# Patient Record
Sex: FEMALE | Race: WHITE | NOT HISPANIC OR LATINO | Employment: OTHER | URBAN - METROPOLITAN AREA
[De-identification: names, ages, dates, MRNs, and addresses within clinical notes are randomized per-mention and may not be internally consistent; named-entity substitution may affect disease eponyms.]

---

## 2017-06-14 ENCOUNTER — RECORDS - HEALTHEAST (OUTPATIENT)
Dept: LAB | Facility: CLINIC | Age: 57
End: 2017-06-14

## 2017-06-14 LAB
CHOLEST SERPL-MCNC: 208 MG/DL
FASTING STATUS PATIENT QL REPORTED: ABNORMAL
HDLC SERPL-MCNC: 62 MG/DL
LDLC SERPL CALC-MCNC: 130 MG/DL
TRIGL SERPL-MCNC: 80 MG/DL

## 2017-06-19 LAB
HPV INTERPRETATION - HISTORICAL: NORMAL
HPV INTERPRETER - HISTORICAL: NORMAL

## 2018-07-27 ENCOUNTER — RECORDS - HEALTHEAST (OUTPATIENT)
Dept: LAB | Facility: CLINIC | Age: 58
End: 2018-07-27

## 2018-07-27 LAB
ALBUMIN SERPL-MCNC: 4 G/DL (ref 3.5–5)
ALP SERPL-CCNC: 81 U/L (ref 45–120)
ALT SERPL W P-5'-P-CCNC: 15 U/L (ref 0–45)
ANION GAP SERPL CALCULATED.3IONS-SCNC: 9 MMOL/L (ref 5–18)
AST SERPL W P-5'-P-CCNC: 15 U/L (ref 0–40)
BILIRUB SERPL-MCNC: 0.5 MG/DL (ref 0–1)
BUN SERPL-MCNC: 14 MG/DL (ref 8–22)
CALCIUM SERPL-MCNC: 9.9 MG/DL (ref 8.5–10.5)
CHLORIDE BLD-SCNC: 107 MMOL/L (ref 98–107)
CHOLEST SERPL-MCNC: 189 MG/DL
CO2 SERPL-SCNC: 24 MMOL/L (ref 22–31)
CREAT SERPL-MCNC: 0.67 MG/DL (ref 0.6–1.1)
FASTING STATUS PATIENT QL REPORTED: NORMAL
GFR SERPL CREATININE-BSD FRML MDRD: >60 ML/MIN/1.73M2
GLUCOSE BLD-MCNC: 100 MG/DL (ref 70–125)
HDLC SERPL-MCNC: 63 MG/DL
LDLC SERPL CALC-MCNC: 111 MG/DL
POTASSIUM BLD-SCNC: 4.1 MMOL/L (ref 3.5–5)
PROT SERPL-MCNC: 6.8 G/DL (ref 6–8)
SODIUM SERPL-SCNC: 140 MMOL/L (ref 136–145)
TRIGL SERPL-MCNC: 73 MG/DL
TSH SERPL DL<=0.005 MIU/L-ACNC: 1.4 UIU/ML (ref 0.3–5)

## 2019-11-11 ENCOUNTER — RECORDS - HEALTHEAST (OUTPATIENT)
Dept: LAB | Facility: CLINIC | Age: 59
End: 2019-11-11

## 2019-11-11 LAB
ALBUMIN SERPL-MCNC: 4 G/DL (ref 3.5–5)
ALP SERPL-CCNC: 95 U/L (ref 45–120)
ALT SERPL W P-5'-P-CCNC: 21 U/L (ref 0–45)
ANION GAP SERPL CALCULATED.3IONS-SCNC: 6 MMOL/L (ref 5–18)
AST SERPL W P-5'-P-CCNC: 19 U/L (ref 0–40)
BILIRUB SERPL-MCNC: 0.5 MG/DL (ref 0–1)
BUN SERPL-MCNC: 13 MG/DL (ref 8–22)
CALCIUM SERPL-MCNC: 9.7 MG/DL (ref 8.5–10.5)
CHLORIDE BLD-SCNC: 104 MMOL/L (ref 98–107)
CHOLEST SERPL-MCNC: 208 MG/DL
CO2 SERPL-SCNC: 29 MMOL/L (ref 22–31)
CREAT SERPL-MCNC: 0.67 MG/DL (ref 0.6–1.1)
FASTING STATUS PATIENT QL REPORTED: ABNORMAL
GFR SERPL CREATININE-BSD FRML MDRD: >60 ML/MIN/1.73M2
GLUCOSE BLD-MCNC: 91 MG/DL (ref 70–125)
HDLC SERPL-MCNC: 71 MG/DL
LDLC SERPL CALC-MCNC: 116 MG/DL
POTASSIUM BLD-SCNC: 3.9 MMOL/L (ref 3.5–5)
PROT SERPL-MCNC: 7 G/DL (ref 6–8)
SODIUM SERPL-SCNC: 139 MMOL/L (ref 136–145)
TRIGL SERPL-MCNC: 105 MG/DL
TSH SERPL DL<=0.005 MIU/L-ACNC: 1.04 UIU/ML (ref 0.3–5)

## 2020-07-29 ENCOUNTER — AMBULATORY - HEALTHEAST (OUTPATIENT)
Dept: CARDIOLOGY | Facility: CLINIC | Age: 60
End: 2020-07-29

## 2020-07-29 ENCOUNTER — RECORDS - HEALTHEAST (OUTPATIENT)
Dept: ADMINISTRATIVE | Facility: OTHER | Age: 60
End: 2020-07-29

## 2020-07-31 ENCOUNTER — COMMUNICATION - HEALTHEAST (OUTPATIENT)
Dept: CARDIOLOGY | Facility: CLINIC | Age: 60
End: 2020-07-31

## 2020-08-03 ENCOUNTER — COMMUNICATION - HEALTHEAST (OUTPATIENT)
Dept: TELEHEALTH | Facility: CLINIC | Age: 60
End: 2020-08-03

## 2020-08-03 ENCOUNTER — OFFICE VISIT - HEALTHEAST (OUTPATIENT)
Dept: CARDIOLOGY | Facility: CLINIC | Age: 60
End: 2020-08-03

## 2020-08-03 ENCOUNTER — AMBULATORY - HEALTHEAST (OUTPATIENT)
Dept: CARDIOLOGY | Facility: CLINIC | Age: 60
End: 2020-08-03

## 2020-08-03 DIAGNOSIS — I10 ESSENTIAL HYPERTENSION, BENIGN: ICD-10-CM

## 2020-08-03 DIAGNOSIS — E03.9 ACQUIRED HYPOTHYROIDISM: ICD-10-CM

## 2020-08-03 DIAGNOSIS — I48.0 PAROXYSMAL ATRIAL FIBRILLATION (H): ICD-10-CM

## 2020-08-03 DIAGNOSIS — E66.812 CLASS 2 OBESITY DUE TO EXCESS CALORIES IN ADULT: ICD-10-CM

## 2020-08-03 DIAGNOSIS — E66.09 CLASS 2 OBESITY DUE TO EXCESS CALORIES IN ADULT: ICD-10-CM

## 2020-08-03 LAB
ANION GAP SERPL CALCULATED.3IONS-SCNC: 8 MMOL/L (ref 5–18)
ATRIAL RATE - MUSE: 60 BPM
BUN SERPL-MCNC: 13 MG/DL (ref 8–22)
CALCIUM SERPL-MCNC: 9.7 MG/DL (ref 8.5–10.5)
CHLORIDE BLD-SCNC: 103 MMOL/L (ref 98–107)
CO2 SERPL-SCNC: 28 MMOL/L (ref 22–31)
CREAT SERPL-MCNC: 0.78 MG/DL (ref 0.6–1.1)
DIASTOLIC BLOOD PRESSURE - MUSE: NORMAL
GFR SERPL CREATININE-BSD FRML MDRD: >60 ML/MIN/1.73M2
GLUCOSE BLD-MCNC: 91 MG/DL (ref 70–125)
INTERPRETATION ECG - MUSE: NORMAL
P AXIS - MUSE: 15 DEGREES
POTASSIUM BLD-SCNC: 4.2 MMOL/L (ref 3.5–5)
PR INTERVAL - MUSE: 176 MS
QRS DURATION - MUSE: 88 MS
QT - MUSE: 434 MS
QTC - MUSE: 434 MS
R AXIS - MUSE: -14 DEGREES
SODIUM SERPL-SCNC: 139 MMOL/L (ref 136–145)
SYSTOLIC BLOOD PRESSURE - MUSE: NORMAL
T AXIS - MUSE: 45 DEGREES
TSH SERPL DL<=0.005 MIU/L-ACNC: 3.36 UIU/ML (ref 0.3–5)
VENTRICULAR RATE- MUSE: 60 BPM

## 2020-08-03 RX ORDER — LEVOTHYROXINE SODIUM 175 UG/1
175 TABLET ORAL DAILY
Status: SHIPPED | COMMUNITY
Start: 2020-08-03 | End: 2021-12-06 | Stop reason: DRUGHIGH

## 2020-08-03 RX ORDER — HYDROCHLOROTHIAZIDE 12.5 MG/1
12.5 TABLET ORAL DAILY
Status: SHIPPED | COMMUNITY
Start: 2020-08-03 | End: 2023-06-16 | Stop reason: ALTCHOICE

## 2020-08-03 RX ORDER — AMLODIPINE AND BENAZEPRIL HYDROCHLORIDE 10; 20 MG/1; MG/1
1 CAPSULE ORAL DAILY
Status: SHIPPED | COMMUNITY
Start: 2020-08-03 | End: 2021-06-16

## 2020-08-03 RX ORDER — PREDNISOLONE ACETATE 10 MG/ML
1 SUSPENSION/ DROPS OPHTHALMIC DAILY
Status: SHIPPED | COMMUNITY
Start: 2020-08-03 | End: 2022-11-16

## 2020-08-03 ASSESSMENT — MIFFLIN-ST. JEOR: SCORE: 1821.88

## 2020-08-05 ENCOUNTER — COMMUNICATION - HEALTHEAST (OUTPATIENT)
Dept: CARDIOLOGY | Facility: CLINIC | Age: 60
End: 2020-08-05

## 2020-08-25 ENCOUNTER — HOSPITAL ENCOUNTER (OUTPATIENT)
Dept: NUCLEAR MEDICINE | Facility: HOSPITAL | Age: 60
Discharge: HOME | End: 2020-08-25
Attending: INTERNAL MEDICINE

## 2020-08-25 ENCOUNTER — HOSPITAL ENCOUNTER (OUTPATIENT)
Dept: CARDIOLOGY | Facility: HOSPITAL | Age: 60
Discharge: HOME | End: 2020-08-25
Attending: INTERNAL MEDICINE

## 2020-08-25 ENCOUNTER — AMBULATORY - HEALTHEAST (OUTPATIENT)
Dept: CARDIOLOGY | Facility: CLINIC | Age: 60
End: 2020-08-25

## 2020-08-25 DIAGNOSIS — I48.0 PAROXYSMAL ATRIAL FIBRILLATION (H): ICD-10-CM

## 2020-08-25 DIAGNOSIS — I77.810 ASCENDING AORTA DILATATION (H): ICD-10-CM

## 2020-08-25 DIAGNOSIS — I10 ESSENTIAL HYPERTENSION, BENIGN: ICD-10-CM

## 2020-08-25 LAB
ASCENDING AORTA: 5 CM
CV STRESS CURRENT BP HE: NORMAL
CV STRESS CURRENT HR HE: 58
CV STRESS CURRENT HR HE: 61
CV STRESS CURRENT HR HE: 63
CV STRESS CURRENT HR HE: 64
CV STRESS CURRENT HR HE: 64
CV STRESS CURRENT HR HE: 65
CV STRESS CURRENT HR HE: 66
CV STRESS CURRENT HR HE: 67
CV STRESS CURRENT HR HE: 69
CV STRESS CURRENT HR HE: 70
CV STRESS CURRENT HR HE: 71
CV STRESS CURRENT HR HE: 76
CV STRESS CURRENT HR HE: 77
CV STRESS CURRENT HR HE: 77
CV STRESS CURRENT HR HE: 83
CV STRESS CURRENT HR HE: 84
CV STRESS CURRENT HR HE: 86
CV STRESS CURRENT HR HE: 88
CV STRESS DEVIATION TIME HE: NORMAL
CV STRESS ECHO PERCENT HR HE: NORMAL
CV STRESS EXERCISE STAGE HE: NORMAL
CV STRESS FINAL RESTING BP HE: NORMAL
CV STRESS FINAL RESTING HR HE: 67
CV STRESS MAX HR HE: 89
CV STRESS MAX TREADMILL GRADE HE: 0
CV STRESS MAX TREADMILL SPEED HE: 0
CV STRESS PEAK DIA BP HE: NORMAL
CV STRESS PEAK SYS BP HE: NORMAL
CV STRESS PHASE HE: NORMAL
CV STRESS PROTOCOL HE: NORMAL
CV STRESS RESTING PT POSITION HE: NORMAL
CV STRESS ST DEVIATION AMOUNT HE: NORMAL
CV STRESS ST DEVIATION ELEVATION HE: NORMAL
CV STRESS ST EVELATION AMOUNT HE: NORMAL
CV STRESS TEST TYPE HE: NORMAL
CV STRESS TOTAL STAGE TIME MIN 1 HE: NORMAL
DOP CALC LVOT AREA: 3.14 CM2
DOP CALC LVOT DIAMETER: 2 CM
DOP CALC LVOT PEAK VEL: 83 CM/S
DOP CALC LVOT STROKE VOLUME: 62.8 CM3
DOP CALC MV VTI: 29.6 CM
DOP CALCLVOT PEAK VEL VTI: 20 CM
EJECTION FRACTION: 59 % (ref 55–75)
FRACTIONAL SHORTENING: 27.7 % (ref 28–44)
INTERVENTRICULAR SEPTUM IN END DIASTOLE: 0.73 CM (ref 0.6–0.9)
IVS/PW RATIO: 1
LA AREA 1: 19 CM2
LA AREA 2: 21 CM2
LEFT ATRIUM LENGTH: 5.4 CM
LEFT ATRIUM VOLUME: 62.8 ML
LEFT VENTRICLE DIASTOLIC VOLUME: 103 CM3 (ref 46–106)
LEFT VENTRICLE SYSTOLIC VOLUME: 42.5 CM3 (ref 14–42)
LEFT VENTRICULAR INTERNAL DIMENSION IN DIASTOLE: 5.53 CM (ref 3.8–5.2)
LEFT VENTRICULAR INTERNAL DIMENSION IN SYSTOLE: 4 CM (ref 2.2–3.5)
LEFT VENTRICULAR MASS: 147.6 G
LEFT VENTRICULAR OUTFLOW TRACT MEAN GRADIENT: 1 MMHG
LEFT VENTRICULAR OUTFLOW TRACT MEAN VELOCITY: 53.8 CM/S
LEFT VENTRICULAR OUTFLOW TRACT PEAK GRADIENT: 3 MMHG
LEFT VENTRICULAR POSTERIOR WALL IN END DIASTOLE: 0.76 CM (ref 0.6–0.9)
MITRAL VALVE E/A RATIO: 0.9
MITRAL VALVE MEAN INFLOW VELOCITY: 55.8 CM/S
MITRAL VALVE PEAK VELOCITY: 103 CM/S
MV AREA VTI: 2.12 CM2
MV AVERAGE E/E' RATIO: 7.1 CM/S
MV DECELERATION TIME: 232 MS
MV E'TISSUE VEL-LAT: 10.3 CM/S
MV E'TISSUE VEL-MED: 8.8 CM/S
MV LATERAL E/E' RATIO: 6.6
MV MEAN GRADIENT: 1 MMHG
MV MEDIAL E/E' RATIO: 7.7
MV PEAK A VELOCITY: 75.2 CM/S
MV PEAK E VELOCITY: 67.9 CM/S
MV PEAK GRADIENT: 4.2 MMHG
MV VALVE AREA BY CONTINUITY EQUATION: 2.1 CM2
NUC STRESS EJECTION FRACTION: 59 %
PR MAX PG: 7 MMHG
PR PEAK VELOCITY: 130 CM/S
PR PEAK VELOCITY: 130 CM/S
RATE PRESSURE PRODUCT: NORMAL
STRESS ECHO BASELINE HR: 59
STRESS ECHO CALCULATED PERCENT HR: 56 %
STRESS ECHO LAST STRESS DIASTOLIC BP: 61
STRESS ECHO LAST STRESS HR: 77
STRESS ECHO LAST STRESS SYSTOLIC BP: 126
STRESS ECHO TARGET HR: 159
TRICUSPID REGURGITATION PEAK PRESSURE GRADIENT: 20.8 MMHG
TRICUSPID VALVE ANULAR PLANE SYSTOLIC EXCURSION: 2.3 CM
TRICUSPID VALVE PEAK REGURGITANT VELOCITY: 228 CM/S

## 2020-08-31 ENCOUNTER — COMMUNICATION - HEALTHEAST (OUTPATIENT)
Dept: CARDIOLOGY | Facility: CLINIC | Age: 60
End: 2020-08-31

## 2020-09-08 ENCOUNTER — HOSPITAL ENCOUNTER (OUTPATIENT)
Dept: CT IMAGING | Facility: HOSPITAL | Age: 60
Discharge: HOME | End: 2020-09-08
Attending: INTERNAL MEDICINE

## 2020-09-08 DIAGNOSIS — I77.810 ASCENDING AORTA DILATATION (H): ICD-10-CM

## 2020-09-08 LAB
CREAT BLD-MCNC: 0.9 MG/DL (ref 0.6–1.1)
GFR SERPL CREATININE-BSD FRML MDRD: >60 ML/MIN/1.73M2

## 2020-09-24 ENCOUNTER — COMMUNICATION - HEALTHEAST (OUTPATIENT)
Dept: CARDIOLOGY | Facility: CLINIC | Age: 60
End: 2020-09-24

## 2020-10-01 ENCOUNTER — AMBULATORY - HEALTHEAST (OUTPATIENT)
Dept: CARDIOLOGY | Facility: CLINIC | Age: 60
End: 2020-10-01

## 2020-10-01 DIAGNOSIS — I48.0 PAROXYSMAL ATRIAL FIBRILLATION (H): ICD-10-CM

## 2020-10-02 ENCOUNTER — COMMUNICATION - HEALTHEAST (OUTPATIENT)
Dept: CARDIOLOGY | Facility: CLINIC | Age: 60
End: 2020-10-02

## 2020-10-19 ENCOUNTER — AMBULATORY - HEALTHEAST (OUTPATIENT)
Dept: CARDIOLOGY | Facility: CLINIC | Age: 60
End: 2020-10-19

## 2020-10-19 ENCOUNTER — OFFICE VISIT - HEALTHEAST (OUTPATIENT)
Dept: CARDIOLOGY | Facility: CLINIC | Age: 60
End: 2020-10-19

## 2020-10-19 DIAGNOSIS — I71.20 THORACIC AORTIC ANEURYSM WITHOUT RUPTURE (H): ICD-10-CM

## 2020-10-19 DIAGNOSIS — E66.812 CLASS 2 OBESITY DUE TO EXCESS CALORIES IN ADULT: ICD-10-CM

## 2020-10-19 DIAGNOSIS — E66.09 CLASS 2 OBESITY DUE TO EXCESS CALORIES IN ADULT: ICD-10-CM

## 2020-10-19 DIAGNOSIS — I77.810 ASCENDING AORTA DILATATION (H): ICD-10-CM

## 2020-10-19 DIAGNOSIS — G47.33 OSA (OBSTRUCTIVE SLEEP APNEA): ICD-10-CM

## 2020-10-19 DIAGNOSIS — I10 ESSENTIAL HYPERTENSION, BENIGN: ICD-10-CM

## 2020-10-19 DIAGNOSIS — I48.0 PAROXYSMAL ATRIAL FIBRILLATION (H): ICD-10-CM

## 2020-10-19 ASSESSMENT — MIFFLIN-ST. JEOR: SCORE: 1713.01

## 2020-11-04 ENCOUNTER — COMMUNICATION - HEALTHEAST (OUTPATIENT)
Dept: CARDIOLOGY | Facility: CLINIC | Age: 60
End: 2020-11-04

## 2020-11-04 DIAGNOSIS — I48.0 PAROXYSMAL ATRIAL FIBRILLATION (H): ICD-10-CM

## 2020-11-13 ENCOUNTER — COMMUNICATION - HEALTHEAST (OUTPATIENT)
Dept: CARDIOLOGY | Facility: CLINIC | Age: 60
End: 2020-11-13

## 2020-11-13 DIAGNOSIS — I48.0 PAROXYSMAL ATRIAL FIBRILLATION (H): ICD-10-CM

## 2020-11-20 ENCOUNTER — RECORDS - HEALTHEAST (OUTPATIENT)
Dept: LAB | Facility: CLINIC | Age: 60
End: 2020-11-20

## 2020-11-20 LAB
ALBUMIN SERPL-MCNC: 4.3 G/DL (ref 3.5–5)
ALP SERPL-CCNC: 87 U/L (ref 45–120)
ALT SERPL W P-5'-P-CCNC: 17 U/L (ref 0–45)
ANION GAP SERPL CALCULATED.3IONS-SCNC: 7 MMOL/L (ref 5–18)
AST SERPL W P-5'-P-CCNC: 15 U/L (ref 0–40)
BILIRUB SERPL-MCNC: 0.5 MG/DL (ref 0–1)
BUN SERPL-MCNC: 11 MG/DL (ref 8–22)
CALCIUM SERPL-MCNC: 9.8 MG/DL (ref 8.5–10.5)
CHLORIDE BLD-SCNC: 104 MMOL/L (ref 98–107)
CHOLEST SERPL-MCNC: 224 MG/DL
CO2 SERPL-SCNC: 30 MMOL/L (ref 22–31)
CREAT SERPL-MCNC: 0.71 MG/DL (ref 0.6–1.1)
FASTING STATUS PATIENT QL REPORTED: ABNORMAL
GFR SERPL CREATININE-BSD FRML MDRD: >60 ML/MIN/1.73M2
GLUCOSE BLD-MCNC: 98 MG/DL (ref 70–125)
HDLC SERPL-MCNC: 72 MG/DL
LDLC SERPL CALC-MCNC: 130 MG/DL
POTASSIUM BLD-SCNC: 3.7 MMOL/L (ref 3.5–5)
PROT SERPL-MCNC: 7.3 G/DL (ref 6–8)
SODIUM SERPL-SCNC: 141 MMOL/L (ref 136–145)
TRIGL SERPL-MCNC: 110 MG/DL
TSH SERPL DL<=0.005 MIU/L-ACNC: 3.73 UIU/ML (ref 0.3–5)

## 2021-01-04 ENCOUNTER — COMMUNICATION - HEALTHEAST (OUTPATIENT)
Dept: CARDIOLOGY | Facility: CLINIC | Age: 61
End: 2021-01-04

## 2021-01-04 DIAGNOSIS — I48.0 PAROXYSMAL ATRIAL FIBRILLATION (H): ICD-10-CM

## 2021-04-14 ENCOUNTER — COMMUNICATION - HEALTHEAST (OUTPATIENT)
Dept: CARDIOLOGY | Facility: CLINIC | Age: 61
End: 2021-04-14

## 2021-04-14 DIAGNOSIS — I48.0 PAROXYSMAL ATRIAL FIBRILLATION (H): ICD-10-CM

## 2021-04-14 RX ORDER — FLECAINIDE ACETATE 50 MG/1
25 TABLET ORAL 2 TIMES DAILY
Qty: 90 TABLET | Refills: 0 | Status: SHIPPED | OUTPATIENT
Start: 2021-04-14 | End: 2021-07-06

## 2021-04-16 ENCOUNTER — HOSPITAL ENCOUNTER (OUTPATIENT)
Dept: CT IMAGING | Facility: HOSPITAL | Age: 61
Discharge: HOME | End: 2021-04-16
Attending: INTERNAL MEDICINE

## 2021-04-16 DIAGNOSIS — I77.810 ASCENDING AORTA DILATATION (H): ICD-10-CM

## 2021-04-16 DIAGNOSIS — I71.20 THORACIC AORTIC ANEURYSM WITHOUT RUPTURE (H): ICD-10-CM

## 2021-04-16 LAB
CREAT BLD-MCNC: 0.8 MG/DL (ref 0.6–1.1)
GFR SERPL CREATININE-BSD FRML MDRD: >60 ML/MIN/1.73M2

## 2021-04-19 ENCOUNTER — RECORDS - HEALTHEAST (OUTPATIENT)
Dept: ADMINISTRATIVE | Facility: OTHER | Age: 61
End: 2021-04-19

## 2021-04-19 ENCOUNTER — AMBULATORY - HEALTHEAST (OUTPATIENT)
Dept: CARDIOLOGY | Facility: CLINIC | Age: 61
End: 2021-04-19

## 2021-04-19 ENCOUNTER — COMMUNICATION - HEALTHEAST (OUTPATIENT)
Dept: CARDIOLOGY | Facility: CLINIC | Age: 61
End: 2021-04-19

## 2021-04-21 ENCOUNTER — OFFICE VISIT - HEALTHEAST (OUTPATIENT)
Dept: CARDIOLOGY | Facility: CLINIC | Age: 61
End: 2021-04-21

## 2021-04-21 DIAGNOSIS — E66.812 CLASS 2 OBESITY DUE TO EXCESS CALORIES IN ADULT: ICD-10-CM

## 2021-04-21 DIAGNOSIS — I48.0 PAROXYSMAL ATRIAL FIBRILLATION (H): ICD-10-CM

## 2021-04-21 DIAGNOSIS — G47.33 OSA (OBSTRUCTIVE SLEEP APNEA): ICD-10-CM

## 2021-04-21 DIAGNOSIS — I10 ESSENTIAL HYPERTENSION, BENIGN: ICD-10-CM

## 2021-04-21 DIAGNOSIS — E03.9 ACQUIRED HYPOTHYROIDISM: ICD-10-CM

## 2021-04-21 DIAGNOSIS — E66.09 CLASS 2 OBESITY DUE TO EXCESS CALORIES IN ADULT: ICD-10-CM

## 2021-04-21 DIAGNOSIS — I71.20 THORACIC AORTIC ANEURYSM WITHOUT RUPTURE (H): ICD-10-CM

## 2021-04-21 RX ORDER — METOPROLOL TARTRATE 25 MG/1
12.5 TABLET, FILM COATED ORAL 2 TIMES DAILY
Qty: 30 TABLET | Refills: 1 | Status: SHIPPED | OUTPATIENT
Start: 2021-04-21 | End: 2021-11-22 | Stop reason: DRUGHIGH

## 2021-04-21 ASSESSMENT — MIFFLIN-ST. JEOR: SCORE: 1826.41

## 2021-06-04 VITALS
RESPIRATION RATE: 14 BRPM | SYSTOLIC BLOOD PRESSURE: 120 MMHG | HEART RATE: 64 BPM | BODY MASS INDEX: 42.53 KG/M2 | HEIGHT: 67 IN | WEIGHT: 271 LBS | DIASTOLIC BLOOD PRESSURE: 76 MMHG

## 2021-06-04 VITALS
BODY MASS INDEX: 38.77 KG/M2 | RESPIRATION RATE: 14 BRPM | HEART RATE: 68 BPM | HEIGHT: 67 IN | DIASTOLIC BLOOD PRESSURE: 84 MMHG | WEIGHT: 247 LBS | SYSTOLIC BLOOD PRESSURE: 140 MMHG

## 2021-06-05 VITALS
SYSTOLIC BLOOD PRESSURE: 128 MMHG | HEART RATE: 64 BPM | WEIGHT: 272 LBS | RESPIRATION RATE: 16 BRPM | DIASTOLIC BLOOD PRESSURE: 80 MMHG | HEIGHT: 67 IN | BODY MASS INDEX: 42.69 KG/M2

## 2021-06-10 NOTE — PROGRESS NOTES
aorta is slightly enlarged, given this we need to obtain CTA looking at entire thoracic aorta. - LBF

## 2021-06-10 NOTE — PATIENT INSTRUCTIONS - HE
Ms. Hedy Haq,  It certainly was nice to meet you today.  Per our conversation you're having some skipped beats in the chest.  This represents atrial fibrillation and the reason I am checking the ultrasound of the heart and the heart monitor as well as a stress test.  We will call you the results of these tests.    I will plan on seeing you in 1-2 months or sooner if need be.  Wero Macias

## 2021-06-10 NOTE — TELEPHONE ENCOUNTER
Wellness Screening Tool  Symptom Screening:  Do you have one of the following NEW symptoms:    Fever (subjective or >100.0)?  No    A new cough?  No    Shortness of breath?  No     Chills? No     New loss of taste or smell? No     Generalized body aches? No     New persistent headache? No     New sore throat? No     Nausea, vomiting, or diarrhea?  No    Within the past 3 weeks, have you been exposed to someone with a known positive illness below:    COVID-19 (known or suspected)?  No    Chicken pox?  No    Mealses?  No    Pertussis?  No    Patient notified of visitor policy- They may have one person accompany them to their appointment, but they will need to wear a mask and will be screened upon arrival for symptoms: Yes  Pt informed to wear a mask: Yes  Pt notified if they develop any symptoms listed above, prior to their appointment, they are to call the clinic directly at [phone] for further instructions.  Yes  Patient's appointment status: Patient will be seen in clinic as scheduled on 8/3/2020 9910 with FABIO

## 2021-06-11 NOTE — TELEPHONE ENCOUNTER
----- Message from SYED Bernal sent at 8/31/2020  7:10 AM CDT -----  Regarding: SARA Notification  We received a notification for new onset afib. The report is in the g drive. Thanks.    Dr. Macias, please review SARA notification above regarding new onset of A fib  on 8/28. Any new recs?

## 2021-06-11 NOTE — TELEPHONE ENCOUNTER
----- Message from Alisia Macias MD sent at 9/24/2020 12:55 PM CDT -----  Can we pass this on to patient, mild enlargement of the aorta, I would like to recheck this in about a year, she can see me to discuss whether there is any family history of dissections or if she tells you some as well.  If she would like I can try to call her later. LF  ----- Message -----  From: Richard Gore MD  Sent: 9/24/2020  12:15 PM CDT  To: Alisia Macias MD, Yenni Aj RN    Les,    Sorry for the delay on this CTA for Hedy Haq. I would normally follow this with another CTA in 6-12 months and then every 12 months after that. If she has any evidence of a genetic abnormality associated with aortopathy, then I would operate sooner (I.e. now for Todd Danlos, Loeys Mahesh or Marfan Syndrome). I would also get a very clear family history about sudden death or death outside the hospital without a known cause, as well as any history of aneurysm or dissection.     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 This is a form of a vascular ring but unlikely to be of clinical significance given her age. 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     The echo looks like a tricuspid valve so no concern for bicuspid. Those are my thoughts. Does that sound reasonable? I am happy to see her and discuss all of this if you would like. I know these patients are often anxious once they understand the natural history and complications of thoracic aortic aneurysms.    Laura, thank you for bringing this to my attention!    Pa,  Raymundo  ----- Message -----  From: Yenni Aj RN  Sent: 9/24/2020   8:55 AM CDT  To: Richard Gore MD    Did you get this? Colleen is looking for you to weigh in on her CT for poss  ascending.    Laura Yarbrough  ----- Message -----  From: Darling Shields, RN  Sent: 9/23/2020   3:26 PM CDT  To: KRISTYN Vital,  Dr. Macias had sent this result note to Dr. Gore to seek his input on surgery. Is there a way you can forward this on to him?  Thanks for any help!  Darling RN     ===View-only below this line===   ----- Message -----   From: Alisia Macias MD   Sent: 9/9/2020   8:37 AM CDT   To: Darling Shields RN, #       Raymundo,   59-year-old lady that I saw incidentally for atrial fibrillation.  Echo shows aortic enlargement so I obtain CTA which shows fusiform 4.9 cm aneurysm.   This is borderline, is a somebody would like to see and potentially need surgery?  If so allow me to contact her and initiate the plan.   LF

## 2021-06-11 NOTE — TELEPHONE ENCOUNTER
Called pt to discuss her heart monitor notifications. She states she is feeling better today. She understands that LBF will continue to monitor and potentially refer to EP for further therapy. 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 She is wondering if Dr. Macias would be ok with this. She reports need to hold her eliquis but she cannot remember how long this hold is needed for.    Dr. Macias, pt is scheduled for R eye cataract surgery with cornea transplantation on 9/22 and wants to make sure you are ok with her proceeding with this procedure? She will be needing to hold her eliquis medication (she wasn't sure exact length of time but will get back to us on this).

## 2021-06-11 NOTE — TELEPHONE ENCOUNTER
Called patient back and updated her on CV surgeon response and LBF response regarding her CTA chest. Pt verbalized understanding and reports that she does not have a family hx of dissections or aneurysms to her knowledge. She did discuss with her mother and she is not aware of a family hx either. Pt has a follow up visit with Dr. Macias and will plan to discuss with him in person at this appointment on October 19th. She believes that these discussions are better served face to face at any rate. 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 -[facility] [place]      Dr. Macias,  Update only- I spoke with Hedy this afternoon. She will plan to discuss CTA chest results and annual scans at upcoming follow up appt on October 19th. She does not have a family hx that she is aware of.  Thanks!  Mal

## 2021-06-12 NOTE — PROGRESS NOTES
----- Message -----  From: Alisia Macias MD  Sent: 10/19/2020   1:13 PM CDT  To: Darling Shields RN    I was planning on rechecking CTA in a year, can you please arrange for repeat CTA in 6 months to further evaluate aorta.  LF  ----- Message -----  From: Richard Gore MD  Sent: 10/19/2020   1:00 PM CDT  To: Alisia Macias MD, Darling Shields, RN, *    Wero    It is borderline. If she has a family history of aortic dissection or rupture or sudden death, then I would consider repair now. Otherwise it could be followed. Her aortic valve looks tricuspid on echo but has mild AI. As the aneurysm enlarges this may worsen, but we still may be able to salvage the valve. The aneurysm actually measures a little larger (5.1 cm) on echo. I still think it could be followed until is reaches 5.5 cm. Maybe a repeat echo and/or CTA in 6 months and then at a year if it remains stable. She should not lift more than 40 pounds and I agree with treating her hypertension with a goal SBP<140 mmHg for sure and more toward 120 mmHg. She might want to get a BP cuff and check it at home to make sure it is not spuriously high.     I can see her if you want--just let me know.    Thank you  Raymundo  ----- Message -----  From: Alisia Macias MD  Sent: 9/9/2020   8:37 AM CDT  To: Darling Shields RN, *    Raymundo,  59-year-old lady that I saw incidentally for atrial fibrillation.  Echo shows aortic enlargement so I obtain CTA which shows fusiform 4.9 cm aneurysm.  This is borderline, is a somebody would like to see and potentially need surgery?  If so allow me to contact her and initiate the plan.  LF

## 2021-06-12 NOTE — PATIENT INSTRUCTIONS - HE
Ms Hedy PARISH Haq,  I enjoyed visiting with you again today.  I am glad to hear you are doing well.  Per our conversation try the FLECANIDE two times a day and let me know if works.  I will plan on seeing you 6 months or call if issues.  Wero Macias

## 2021-06-13 NOTE — TELEPHONE ENCOUNTER
----- Message from BLESSING Schreiber sent at 11/13/2020  3:22 PM CST -----  Regarding: LBF PATIENT  General phone call:    Caller: PATIENT  Primary cardiologist: FABIO  Detailed reason for call: NEEDS REFILL ON flecainide (TAMBOCOR) 50 MG tablet, [id number] [place] [address] [address] , PATIENT STATES SHE TAKES HALF 50MG TABLET two times a day, THEREFORE THE PHARMACY SAYS IT IS TO SOON TO FILL THE RX.    Best phone number: [phone]    Best time to contact: ANY    Ok to leave a detailed message? YES    Device? NO    Additional Info:         Per note with FABIO on 10/19/2020:   Plan  1.  Recheck chest CT and of aorta in a year.  2.  Weight loss.  3.  Increase 525 mg p.o. twice daily, if recurrent A. fib increase to 50 mg p.o. twice daily and then consider ablation.  4.  Sleep study inappropriate sleep apnea therapy.  5.  Follow-up me in 6 months to address all this.  6.  Monitor blood pressure and if needed increase meds further          Called patient and clarified that she is on two times a day dosing now at 25 mg twice a day. This is accurate so writer sent over to [facility] with a note to pharmacy that it is a dose frequency change and to fill asap. -[facility] [place]

## 2021-06-14 ENCOUNTER — COMMUNICATION - HEALTHEAST (OUTPATIENT)
Dept: CARDIOLOGY | Facility: CLINIC | Age: 61
End: 2021-06-14

## 2021-06-14 DIAGNOSIS — I48.0 PAROXYSMAL ATRIAL FIBRILLATION (H): ICD-10-CM

## 2021-06-14 DIAGNOSIS — I71.20 THORACIC AORTIC ANEURYSM WITHOUT RUPTURE (H): ICD-10-CM

## 2021-06-14 NOTE — TELEPHONE ENCOUNTER
----- Message from BLESSING Singh Junior sent at 1/4/2021  9:31 AM CST -----  Regarding: FABIO Patient  General phone call:    Caller: Patient  Primary cardiologist: FABIO  Detailed reason for call: Patient questions regarding the medication  Best phone number: [phone]  Best time to contact: Any  Ok to leave a detailed message? No  Device? No    Additional Info:     Pt requesting refill. She was only given 45 tablets of her flecainide pill last refill. She is taking 25 mg two times a day. 90-day refill send. -kcl

## 2021-06-16 ENCOUNTER — COMMUNICATION - HEALTHEAST (OUTPATIENT)
Dept: CARDIOLOGY | Facility: CLINIC | Age: 61
End: 2021-06-16

## 2021-06-16 DIAGNOSIS — I48.0 PAROXYSMAL ATRIAL FIBRILLATION (H): ICD-10-CM

## 2021-06-16 DIAGNOSIS — I71.20 THORACIC AORTIC ANEURYSM WITHOUT RUPTURE (H): ICD-10-CM

## 2021-06-16 PROBLEM — I10 ESSENTIAL HYPERTENSION, BENIGN: Status: ACTIVE | Noted: 2020-08-03

## 2021-06-16 PROBLEM — G47.33 OSA (OBSTRUCTIVE SLEEP APNEA): Status: ACTIVE | Noted: 2020-10-19

## 2021-06-16 PROBLEM — E03.9 ACQUIRED HYPOTHYROIDISM: Status: ACTIVE | Noted: 2020-08-03

## 2021-06-16 PROBLEM — E66.09 CLASS 2 OBESITY DUE TO EXCESS CALORIES IN ADULT: Status: ACTIVE | Noted: 2020-08-03

## 2021-06-16 PROBLEM — E66.812 CLASS 2 OBESITY DUE TO EXCESS CALORIES IN ADULT: Status: ACTIVE | Noted: 2020-08-03

## 2021-06-16 RX ORDER — METOPROLOL TARTRATE 25 MG/1
25 TABLET, FILM COATED ORAL 2 TIMES DAILY
Qty: 180 TABLET | Refills: 2 | Status: SHIPPED | OUTPATIENT
Start: 2021-06-16 | End: 2022-03-15

## 2021-06-16 RX ORDER — BENAZEPRIL HYDROCHLORIDE 20 MG/1
20 TABLET ORAL DAILY
Qty: 90 TABLET | Refills: 1 | Status: SHIPPED | OUTPATIENT
Start: 2021-06-16 | End: 2021-12-13

## 2021-06-16 NOTE — PATIENT INSTRUCTIONS - HE
Ms Hedy PARISH Haq,  I enjoyed visiting with you again today.  I am glad to hear you are doing well.  Per our conversation let us try the METOPROLOL 1/2 two times a day and let me know if issues at [phone].  I will plan on seeing you 6 months or sooner if needed.  Wero Macias

## 2021-06-17 NOTE — TELEPHONE ENCOUNTER
Telephone Encounter by Iris Freitas RN at 8/31/2020  2:36 PM     Author: Iris Freitas RN Service: -- Author Type: Registered Nurse    Filed: 8/31/2020  2:38 PM Encounter Date: 8/31/2020 Status: Signed    : Iris Freitas RN (Registered Nurse)       Alisia Macias MD Larsen, Kellie C, RN    Caller: Unspecified (Today,  7:10 AM)               Given that she is having fairly frequent atrial fibrillation we will continue to watch the monitor, if several more episodes will then send her to EP for discussion of antiarrhythmic therapy but most likely ablation.  LF      Called and LM for pt to return call to discuss. -kcl

## 2021-06-17 NOTE — TELEPHONE ENCOUNTER
Telephone Encounter by Iris Freitas RN at 9/1/2020 11:34 AM     Author: Iris Freitas RN Service: -- Author Type: Registered Nurse    Filed: 9/1/2020 11:38 AM Encounter Date: 8/31/2020 Status: Addendum    : Iris Freitas RN (Registered Nurse)    Related Notes: Original Note by Iris Freitas RN (Registered Nurse) filed at 9/1/2020 11:36 AM       Alisia Macias MD Larsen, Kellie C, RN    Caller: Unspecified (Yesterday,  7:10 AM)               No issues proceeding with eye surgery given atrial fibrillation.  Agree with evaluation for Eliquis hold with eye doctors recommendations.  LF      Called pt and shared with her [facility] recs. No further questions. -kcl

## 2021-06-17 NOTE — TELEPHONE ENCOUNTER
Telephone Encounter by Darling Shields RN at 10/2/2020 10:22 AM     Author: Darling Shields RN Service: -- Author Type: Registered Nurse    Filed: 10/2/2020 10:22 AM Encounter Date: 10/2/2020 Status: Signed    : Darling Shields RN (Registered Nurse)       Message  Received: Yesterday  Message Contents   Alisia Macias MD Caswell, Mallory J RN             Thanks very helpful as long as a note to this effect is in chart.   LF    Previous Messages    ----- Message -----   From: Darling Shields RN   Sent: 10/1/2020   2:25 PM CDT   To: Alisia Macias MD     Hi Dr. Macias,   I spoke with Hedy- she could feel the Afib episodes - describes it as feeling an anxious feeling in her chest like a bubbling. She is agreeable to Flecainide- so will send forth a small supply at below dose-25 mg once a day and check in with her next week. She will see you 10/19. Just an update.   Thanks,   Mal   ----- Message -----   From: Alisia Macias MD   Sent: 9/29/2020   7:10 PM CDT   To: Darling Shields RN     Please let her know about 14 % A fib and up to 10 hours in a row. If she if feeling it would go to daily flecanide 1 pill or 25 mg a day. If not really that bad then continue as doing.

## 2021-06-20 NOTE — LETTER
Letter by Darling Shields RN at      Author: Darling Shields RN Service: -- Author Type: --    Filed:  Encounter Date: 8/5/2020 Status: (Other)         Hedy Haq  [address] [facility] [address]             August 5, 2020         Dear Ms. Haq,    Below are the results from your recent visit:    Resulted Orders   Basic Metabolic Panel   Result Value Ref Range    Sodium 139 136 - 145 mmol/L    Potassium 4.2 3.5 - 5.0 mmol/L    Chloride 103 98 - 107 mmol/L    CO2 28 22 - 31 mmol/L    Anion Gap, Calculation 8 5 - 18 mmol/L    Glucose 91 70 - 125 mg/dL    Calcium 9.7 8.5 - 10.5 mg/dL    BUN 13 8 - 22 mg/dL    Creatinine 0.78 0.60 - 1.10 mg/dL    GFR MDRD Af Amer >60 >60 mL/min/1.73m2    GFR MDRD Non Af Amer >60 >60 mL/min/1.73m2    Narrative    Fasting Glucose reference range is 70-99 mg/dL per  American Diabetes Association (ADA) guidelines.   TSH   Result Value Ref Range    TSH 3.36 0.30 - 5.00 uIU/mL     The test results show that your blood work is stable. Here is the message from Dr. 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  TSH normal potassium normal.  This is not the reason why she has atrial fibrillation.   LF            Please call with questions or contact us using [employer].    Sincerely,    Darling SIMONS

## 2021-06-21 NOTE — LETTER
Letter by Alisia Macias MD at      Author: Alisia Macias MD Service: -- Author Type: --    Filed:  Encounter Date: 4/19/2021 Status: (Other)         Hedy Haq  [address] [facility] [address]     April 19, 2021     Dear Ms. Haq,    Below are the results from your recent visit:    Resulted Orders   CTA Chest    Narrative    EXAM: CTA CHEST  LOCATION: [facility]  DATE/TIME: 4/16/2021 6:35 PM    FINDINGS:  ANGIOGRAM CHEST: The main pulmonary artery is 38 mm in diameter. No pulmonary emboli. The sinuses of Valsalva are approximately 3.6 cm in diameter. The sinotubular junction is normal in size, though measurement is difficult due to motion. There is   fusiform aneurysmal enlargement of the ascending tubular aorta, which is 4.7 x 4.6 cm in maximum dimension with biorthogonal measurements (unchanged when using similar measurement technique).  Incidentally noted is an aberrant right subclavian artery.     LUNGS AND PLEURA: Normal.    MEDIASTINUM/AXILLAE: An 11 mm left axillary lymph node was not enlarged on the comparison study.     CORONARY ARTERY CALCIFICATION: None.    UPPER ABDOMEN: Cholelithiasis.     MUSCULOSKELETAL: Normal.      Impression    1.  Unchanged fusiform aneurysmal enlargement of the ascending tubular aorta. Maximum size is 4.7 x 4.6 cm.  2.  Enlargement of the main pulmonary artery can be seen with pulmonary hypertension.  3.  Mildly enlarged left axillary lymph node. This is nonspecific though may be reactive.     The test results show that your current aorta size is unchanged, I would consider the current medications and follow-up with no major drastic changes.   Please call with questions or contact us using [employer].    Sincerely,    Electronically signed by Alisia Macias MD

## 2021-06-29 NOTE — PROGRESS NOTES
Progress Notes by Alisia Macias MD at 10/19/2020  8:10 AM     Author: Alisia Macias MD Service: -- Author Type: Physician    Filed: 10/19/2020  8:44 AM Encounter Date: 10/19/2020 Status: Signed    : Alisia Macias MD (Physician)           [facility] Follow-up Note    Assessment & Plan        1. Paroxysmal atrial fibrillation (H)  -truly symptomatic, and paroxysmal and that she is in sinus rhythm today.  Given this, Eliquis 5 mg p.o. twice daily for [id number] is 2.    No structural heart disease on echo, renal profile shows potassium is above 4 given HCTZ, as well as TSH normal given history of thyroid problem.    Normal nuclear exercise stress test so no issues with flecainide use round-the-clock.  If frequent atrial fibrillation increase flecainide to 50 mg p.o. twice daily and also would also consider ablation.   2. Essential hypertension, benign -borderline elevated today and she tells me that at home is lower.  Continue to monitor given her aneurysm.   3. Class 2 obesity due to excess calories in adult-work on weight loss and given this arrange for sleep apnea rule out.    4. KAREN (obstructive sleep apnea) -does have frequent amount of desaturations and she states she will discuss with her primary Dr. Arun Good to pursue sleep study.   5. Thoracic aortic aneurysm without rupture (H) -4.9 cm and recheck CT in a year.     Plan  1.  Recheck chest CT and of aorta in a year.  2.  Weight loss.  3.  Increase 525 mg p.o. twice daily, if recurrent A. fib increase to 50 mg p.o. twice daily and then consider ablation.  4.  Sleep study inappropriate sleep apnea therapy.  5.  Follow-up me in 6 months to address all this.  6.  Monitor blood pressure and if needed increase meds further.    Subjective  CC: 59-year-old white female here for follow-up to go over testing.  She tells me about 3-4 times a week, 5 minutes each time, she has an anxious pounding sensation in her chest.   "No significant fatigue, shortness of breath, syncope, dizziness or peripheral edema.  Decreased her caffeine remarkably.    Medications  Current Outpatient Medications   Medication Sig   ? amLODIPine-benazepril (LOTREL) 10-20 mg per capsule Take 1 capsule by mouth daily.   ? apixaban ANTICOAGULANT (ELIQUIS) 5 mg Tab tablet Take 5 mg by mouth 2 (two) times a day.   ? flecainide (TAMBOCOR) 50 MG tablet Take 0.5 tablets (25 mg total) by mouth daily.   ? hydroCHLOROthiazide (HYDRODIURIL) 12.5 MG tablet Take 12.5 mg by mouth daily.   ? levothyroxine (SYNTHROID, LEVOTHROID) 175 MCG tablet Take 175 mcg by mouth daily.   ? prednisoLONE acetate (PRED-FORTE) 1 % ophthalmic suspension 1 drop 3 (three) times a day.       Objective  /84 (Patient Site: Left Arm, Patient Position: Sitting, Cuff Size: Adult Large)   Pulse 68   Resp 14   Ht 5' 7\" (1.702 m)   Wt (!) 247 lb (112 kg)   BMI 38.69 kg/m      General Appearance:    Alert, cooperative, no distress, appears stated age   Head:    Normocephalic, without obvious abnormality, atraumatic   Throat:   Lips, mucosa, and tongue normal; teeth and gums normal   Neck:   Supple, symmetrical, trachea midline, no adenopathy;        thyroid:  No enlargement/tenderness/nodules; no carotid    bruit or JVD   Back:     Symmetric, no curvature, ROM normal, no CVA tenderness   Lungs:     Clear to auscultation bilaterally, respirations unlabored   Chest wall:    No tenderness or deformity   Heart:    Regular rate and rhythm, S1 and S2 normal, no murmur, rub   or gallop   Abdomen:     Soft, non-tender, bowel sounds active all four quadrants,     no masses, no organomegaly   Extremities:   Normal, atraumatic, no cyanosis or edema   Pulses:   2+ and symmetric all extremities   Skin:   Skin color, texture, turgor normal, no rashes or lesions     Results    Lab Results personally reviewed   Lab Results   Component Value Date    CHOL 208 (H) 11/11/2019    CHOL 189 07/27/2018     Lab Results "   Component Value Date    HDL 71 11/11/2019    HDL 63 07/27/2018     Lab Results   Component Value Date    LDLCALC 116 11/11/2019    LDLCALC 111 07/27/2018     Lab Results   Component Value Date    TRIG 105 11/11/2019    TRIG 73 07/27/2018     No results found for: WBC, HGB, HCT, PLT  Lab Results   Component Value Date    CREATININE 0.78 08/03/2020    BUN 13 08/03/2020     08/03/2020    K 4.2 08/03/2020    CO2 28 08/03/2020     Review of Systems:   General: WNL  Eyes: WNL  Ears/Nose/Throat: WNL  Lungs: WNL  Heart: Irregular Heartbeat  Stomach: WNL  Bladder: WNL  Muscle/Joints: WNL  Skin: WNL  Nervous System: WNL  Mental Health: WNL     Blood: WNL

## 2021-06-29 NOTE — PROGRESS NOTES
Progress Notes by Darling Shields, RN at 10/1/2020  2:00 PM     Author: Darling Shields RN Service: -- Author Type: Registered Nurse    Filed: 10/1/2020  2:32 PM Encounter Date: 10/1/2020 Status: Signed    : Darling Shields RN (Registered Nurse)       Alisia Macias MD Caswell, Mallory J, RN             Please let her know about 14 % A fib and up to 10 hours in a row. If she if feeling it would go to daily flecanide 1 pill or 25 mg a day. If not really that bad then continue as doing.   LF     Called patient and updated on results and amount of time in Afib during monitoring period. She verbalized understanding and is agreeable to starting Flecainide. She could feel when she was in Atrial Fibrillation and described the sensation as an anxious feeling like a bubbling in her chest. 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  Will check in with her next week to see how she is tolerating. Rx sent to [facility] on file. Pt to follow up as scheduled 10/19/2020. [place] [place]

## 2021-06-29 NOTE — PROGRESS NOTES
Progress Notes by Alisia Macias MD at 8/3/2020  9:10 AM     Author: Alisia Macias MD Service: -- Author Type: Physician    Filed: 8/3/2020 10:08 AM Encounter Date: 8/3/2020 Status: Signed    : Alisia Macias MD (Physician)         [facility] Office Consult     Assessment:     1. Paroxysmal atrial fibrillation (H) -truly symptomatic, and paroxysmal and that she is in sinus rhythm today.  Given this, will continue Eliquis 5 mg p.o. twice daily for her chads 2 vasc score is 2.  In addition, will check echocardiogram looking for structural heart disease, renal profile to make sure potassium is above 4 given HCTZ, as well as TSH given history of thyroid problem.  Will perform nuclear exercise stress test, no stress echo given body habitus.  If no ischemia will then address flecainide.  Will obtain 21-day event monitor and if very few paroxysms do pill in the pocket approach, if on the other hand frequent atrial fibrillation would then use round-the-clock flecainide.  If frequent atrial ablation would also consider ablation.   2. Class 2 obesity due to excess calories in adult -work on weight loss and given this arrange for sleep apnea rule out.   3. Essential hypertension, benign -under good control currently but given HCTZ check potassium.   4. Acquired hypothyroidism -check TSH.   5.  Bradycardia-she did have slow ventricular response of 51, may have an element of sick sinus syndrome.     Plan:   1.  Renal profile today and TSH today and check if abnormal.  2.  Continue to work on weight loss.  3.  Decrease caffeinated beverages.  4.  Check echocardiogram looking for structural heart disease.  5.  Check exercise nuclear stress test, not exercise echo given body habitus, if no ischemia feel free to use flecainide.  6.  21-day event monitor, if very infrequent episodes do pill in the pocket approach, if very frequent episodes will then consider ablation or round-the-clock  flecainide.  7.  Follow-up with me in about 3 months to review all of the above.    History of Present Illness:   Thank you for asking the [facility] team to see Hedy Haq a 59 y.o.  female  in consultation  to evaluate atrial fibrillation.   For about 3 years now, patient has had symptoms of palpitations coming and going.  These are mainly in the chest without body feeling of jitteriness.  She states when she was having cataract procedures performed that she felt well but the eye doctors noted to be atrial fibrillation referred her to her primary.  She states at times she does feel her heart racing and it does come and go.  There is however no significant syncope, dizziness, shortness of breath, PND, orthopnea or peripheral edema.    Past Medical History:   Acquired hypothyroidism following surgery  Essential hypertension  Obesity  Possible  obstructive sleep apnea  Cataract  Paroxysmal atrial fibrillation    Past history is negative for cancer, tuberculosis, diabetes mellitus, myocardial infarction,  rheumatic fever, cerebrovascular accident, chronic kidney disease, peptic ulcer disease, chronic obstructive pulmonary disease,  and no lipid disorder.    Past Surgical History:   No past surgical history on file.    Family History:   Family history positive for hypertension both mother and father, negative for coronary artery disease or atrial fibrillation.    Social History:   She lives at home independently with her , is an RN working as a nurse coordinator at [employer], is exposed to secondhand smoke from her , reports that she has never smoked. She has never used smokeless tobacco. She reports that she does not use drugs. The primary care physician is Arun Good MD    Meds:   Scheduled Meds:  Current Outpatient Medications   Medication Sig Dispense Refill   ? amLODIPine-benazepril (LOTREL) 10-20 mg per capsule Take 1 capsule by mouth daily.     ? apixaban  "ANTICOAGULANT (ELIQUIS) 5 mg Tab tablet Take 5 mg by mouth 2 (two) times a day.     ? hydroCHLOROthiazide (HYDRODIURIL) 12.5 MG tablet Take 12.5 mg by mouth daily.     ? levothyroxine (SYNTHROID, LEVOTHROID) 175 MCG tablet Take 175 mcg by mouth daily.     ? prednisoLONE acetate (PRED-FORTE) 1 % ophthalmic suspension 1 drop 3 (three) times a day.       No current facility-administered medications for this visit.        PRN Meds:.    Allergies:   Patient has no known allergies.    Objective:      Physical Exam  (!) 271 lb (122.9 kg)  5' 7\" (1.702 m)  Body mass index is 42.44 kg/m .  /76 (Patient Site: Left Arm, Patient Position: Sitting, Cuff Size: Adult Large)   Pulse 64   Resp 14   Ht 5' 7\" (1.702 m)   Wt (!) 271 lb (122.9 kg)   BMI 42.44 kg/m      General Appearance:   Alert, cooperative and in no acute distress.   HEENT:  No scleral icterus; the mucous membranes were pink and moist.   Neck: JVP normal. No thyromegaly. No HJR   Chest: The spine was straight. The chest was symmetric.   Lungs:   Respirations unlabored; the lungs are clear to auscultation.   Cardiovascular:   S1 and S2 without murmur, clicks or rubs. Brachial, radial, carotid and posterior tibial pulses are intact and symetrical.  No carotid bruits noted   Abdomen:  No organomegaly, masses, bruits, or tenderness. Bowels sounds are present   Extremities: No cyanosis, clubbing, or edema.   Skin: No xanthelasma.   Neurologic: Mood and affect are appropriate.         Lab Reviewed Personally by myself  Lab Results   Component Value Date     11/11/2019    K 3.9 11/11/2019     11/11/2019    CO2 29 11/11/2019    BUN 13 11/11/2019    CREATININE 0.67 11/11/2019    CALCIUM 9.7 11/11/2019     No results found for: WBC, HGB, HCT, MCV, PLT  Lab Results   Component Value Date    CHOL 208 (H) 11/11/2019    TRIG 105 11/11/2019    HDL 71 11/11/2019     No results found for: BNP    ECG personally reviewed by myself shows normal sinus rhythm, " within normal limits.         Review of Systems:     Review of Systems:   General: WNL  Eyes: Visual Distubance  Ears/Nose/Throat: WNL  Lungs: WNL  Heart: Irregular Heartbeat  Stomach: WNL  Bladder: WNL  Muscle/Joints: WNL  Skin: WNL  Nervous System: WNL  Mental Health: WNL     Blood: WNL

## 2021-06-30 NOTE — PROGRESS NOTES
Progress Notes by Alisia Macias MD at 4/21/2021  7:50 AM     Author: Alisia Macias MD Service: -- Author Type: Physician    Filed: 4/21/2021  8:31 AM Encounter Date: 4/21/2021 Status: Signed    : Alisia Macias MD (Physician)           [facility]  Heart Care Clinic Follow-up Note    Assessment & Plan        1. Paroxysmal atrial fibrillation (H)   -truly symptomatic, and persistent and she is in sinus rhythm today.  Given this, Eliquis 5 mg p.o. twice daily for  [id number] score 2. No structural heart disease on echo, renal profile shows potassium is above 4 given HCTZ, as well as TSH normal given history of thyroid problem.    Normal nuclear exercise stress test so no issues with flecainide use round-the-clock 25 mg p.o. twice daily.  Given the aortic root enlargement would like to switch her off of this eventually to sotalol, will start metoprolol 12.5 mg p.o. twice daily and if tolerated consider switching this and Tambocor over to sotalol 80 mg p.o. twice daily.  If frequent atrial fibrillation consider ablation.   2. Thoracic aortic aneurysm without rupture (H)-based on CAT scan 4.7 x 4.6 which is unchanged from 1 7 months prior.  Surgery reviewed and feel it is more like 5.1 and some mild aortic insufficiency of the tricuspid valve.  They agree with watchful waiting to control blood pressure and should it increase up to 5.5 then surgery at that time.  Given this, we will add metoprolol 12.5 mg p.o. twice daily.   3. KAREN (obstructive sleep apnea)-desaturations documented and discussed with primary about sleep study.   4. Essential hypertension, benign-under good control on Lotrel and HCTZ and adding metoprolol.   5. Class 2 obesity due to excess calories in adult-weight loss.   6. Acquired hypothyroidism-so noted with normal TSH.     Plan  1.  Add metoprolol 12.5 mg p.o. twice daily, she will let me know how she does with a 30-day supply which case we will get her a 90-day  "supply.  2.  If increased atrial fibrillation would change metoprolol and flecainide over to sotalol 80 mg p.o. twice daily.  3.  Repeat CAT scan in 6 months.  4.  Follow-up with me in 7 months to review all of above.    Subjective  CC: 60-year-old white female being seen in follow-up.  I reviewed CT scan of chest with her.  She tells me maybe once a week, anywhere from 30 minutes to 4 hours she will get palpitations.  She does not necessarily get lightheaded or dizzy or fatigued with these.  In general she is active and denies any syncope, dizziness, chest discomfort, palpitations other than above, PND, thigh apnea, shortness of breath but does admit to some mild peripheral edema which the HCTZ helps.    Medications  Current Outpatient Medications   Medication Sig   ? amLODIPine-benazepril (LOTREL) 10-20 mg per capsule Take 1 capsule by mouth daily.   ? apixaban ANTICOAGULANT (ELIQUIS) 5 mg Tab tablet Take 5 mg by mouth 2 (two) times a day.   ? flecainide (TAMBOCOR) 50 MG tablet Take 0.5 tablets (25 mg total) by mouth 2 (two) times a day.   ? hydroCHLOROthiazide (HYDRODIURIL) 12.5 MG tablet Take 12.5 mg by mouth daily.   ? levothyroxine (SYNTHROID, LEVOTHROID) 175 MCG tablet Take 175 mcg by mouth daily.   ? prednisoLONE acetate (PRED-FORTE) 1 % ophthalmic suspension 1 drop daily.    ? metoprolol tartrate (LOPRESSOR) 25 MG tablet Take 0.5 tablets (12.5 mg total) by mouth 2 (two) times a day.       Objective  /80 (Patient Site: Left Arm, Patient Position: Sitting, Cuff Size: Adult Large)   Pulse 64   Resp 16   Ht 5' 7\" (1.702 m)   Wt (!) 272 lb (123.4 kg)   BMI 42.60 kg/m      General Appearance:    Alert, cooperative, no distress, appears stated age   Head:    Normocephalic, without obvious abnormality, atraumatic   Throat:   Lips, mucosa, and tongue normal; teeth and gums normal   Neck:   Supple, symmetrical, trachea midline, no adenopathy;        thyroid:  No enlargement/tenderness/nodules; no carotid   "  bruit or JVD   Back:     Symmetric, no curvature, ROM normal, no CVA tenderness   Lungs:     Clear to auscultation bilaterally, respirations unlabored   Chest wall:    No tenderness or deformity   Heart:    Regular rate and rhythm, S1 and S2 normal, no murmur, rub   or gallop   Abdomen:     Soft, non-tender, bowel sounds active all four quadrants,     no masses, no organomegaly   Extremities:   Normal, atraumatic, no cyanosis, 1/4 bipedal edema   Pulses:   2+ and symmetric all extremities   Skin:   Skin color, texture, turgor normal, no rashes or lesions     Results    Lab Results personally reviewed   Lab Results   Component Value Date    CHOL 224 (H) 11/20/2020    CHOL 208 (H) 11/11/2019     Lab Results   Component Value Date    HDL 72 11/20/2020    HDL 71 11/11/2019     Lab Results   Component Value Date    LDLCALC 130 (H) 11/20/2020    LDLCALC 116 11/11/2019     Lab Results   Component Value Date    TRIG 110 11/20/2020    TRIG 105 11/11/2019     No results found for: WBC, HGB, HCT, PLT  Lab Results   Component Value Date    CREATININE 0.71 11/20/2020    BUN 11 11/20/2020     11/20/2020    K 3.7 11/20/2020    CO2 30 11/20/2020     Review of Systems:   General: WNL  Eyes: WNL  Ears/Nose/Throat: WNL  Lungs: WNL  Heart: WNL  Stomach: WNL  Bladder: WNL  Muscle/Joints: WNL  Skin: WNL  Nervous System: WNL  Mental Health: WNL     Blood: WNL

## 2021-07-04 NOTE — TELEPHONE ENCOUNTER
Telephone Encounter by Darling Shields, RN at 6/16/2021 11:34 AM     Author: Darling Shields RN Service: -- Author Type: Registered Nurse    Filed: 6/16/2021 11:43 AM Encounter Date: 6/14/2021 Status: Signed    : Darling Shields RN (Registered Nurse)       Alisia Macias MD Caswell, Mallory J, RN   Phone Number: [phone]             Please have patient increase metoprolol to 25 mg p.o. twice daily, once on stable dose would like to lower dose of Norvasc, if tolerates the beta-blocker with then need to change the Lotrel 10/20 to only benazepril or Lotensin 20 mg tablets.  Please let me know how this is going and being addressed.  LF        Rx sent for Metoprolol 25 mg PO two times a day. Will clarify timing of when to change to plain Lotensin and dropping Norvasc. -[facility] [place]

## 2021-07-06 ENCOUNTER — COMMUNICATION - HEALTHEAST (OUTPATIENT)
Dept: CARDIOLOGY | Facility: CLINIC | Age: 61
End: 2021-07-06

## 2021-07-06 DIAGNOSIS — I48.0 PAROXYSMAL ATRIAL FIBRILLATION (H): ICD-10-CM

## 2021-07-06 RX ORDER — FLECAINIDE ACETATE 50 MG/1
TABLET ORAL
Qty: 90 TABLET | Refills: 0 | Status: SHIPPED | OUTPATIENT
Start: 2021-07-06 | End: 2021-10-06

## 2021-07-13 ENCOUNTER — RECORDS - HEALTHEAST (OUTPATIENT)
Dept: ADMINISTRATIVE | Facility: CLINIC | Age: 61
End: 2021-07-13

## 2021-07-21 ENCOUNTER — RECORDS - HEALTHEAST (OUTPATIENT)
Dept: ADMINISTRATIVE | Facility: CLINIC | Age: 61
End: 2021-07-21

## 2021-08-21 ENCOUNTER — HEALTH MAINTENANCE LETTER (OUTPATIENT)
Age: 61
End: 2021-08-21

## 2021-10-06 DIAGNOSIS — I48.0 PAROXYSMAL ATRIAL FIBRILLATION (H): ICD-10-CM

## 2021-10-06 RX ORDER — FLECAINIDE ACETATE 50 MG/1
25 TABLET ORAL 2 TIMES DAILY
Qty: 90 TABLET | Refills: 0 | Status: SHIPPED | OUTPATIENT
Start: 2021-10-06 | End: 2022-01-03

## 2021-10-16 ENCOUNTER — HEALTH MAINTENANCE LETTER (OUTPATIENT)
Age: 61
End: 2021-10-16

## 2021-10-26 ENCOUNTER — HOSPITAL ENCOUNTER (OUTPATIENT)
Dept: CT IMAGING | Facility: HOSPITAL | Age: 61
Discharge: HOME | End: 2021-10-26
Attending: INTERNAL MEDICINE | Admitting: INTERNAL MEDICINE
Payer: COMMERCIAL

## 2021-10-26 DIAGNOSIS — I71.20 THORACIC AORTIC ANEURYSM WITHOUT RUPTURE (H): ICD-10-CM

## 2021-10-26 LAB
CREAT BLD-MCNC: 0.9 MG/DL (ref 0.6–1.1)
GFR SERPL CREATININE-BSD FRML MDRD: >60 ML/MIN/1.73M2

## 2021-10-26 PROCEDURE — 250N000011 HC RX IP 250 OP 636: Performed by: INTERNAL MEDICINE

## 2021-10-26 PROCEDURE — 82565 ASSAY OF CREATININE: CPT

## 2021-10-26 PROCEDURE — 71275 CT ANGIOGRAPHY CHEST: CPT

## 2021-10-26 RX ORDER — IOPAMIDOL 755 MG/ML
100 INJECTION, SOLUTION INTRAVASCULAR ONCE
Status: COMPLETED | OUTPATIENT
Start: 2021-10-26 | End: 2021-10-26

## 2021-10-26 RX ADMIN — IOPAMIDOL 100 ML: 755 INJECTION, SOLUTION INTRAVENOUS at 09:46

## 2021-11-22 ENCOUNTER — OFFICE VISIT (OUTPATIENT)
Dept: CARDIOLOGY | Facility: CLINIC | Age: 61
End: 2021-11-22
Payer: COMMERCIAL

## 2021-11-22 VITALS
WEIGHT: 282 LBS | BODY MASS INDEX: 44.17 KG/M2 | RESPIRATION RATE: 12 BRPM | SYSTOLIC BLOOD PRESSURE: 122 MMHG | DIASTOLIC BLOOD PRESSURE: 72 MMHG | HEART RATE: 56 BPM

## 2021-11-22 DIAGNOSIS — I71.20 THORACIC AORTIC ANEURYSM WITHOUT RUPTURE (H): Primary | ICD-10-CM

## 2021-11-22 DIAGNOSIS — E66.01 CLASS 2 SEVERE OBESITY DUE TO EXCESS CALORIES WITH SERIOUS COMORBIDITY AND BODY MASS INDEX (BMI) OF 36.0 TO 36.9 IN ADULT (H): ICD-10-CM

## 2021-11-22 DIAGNOSIS — I48.0 PAROXYSMAL ATRIAL FIBRILLATION (H): ICD-10-CM

## 2021-11-22 DIAGNOSIS — G47.33 OSA (OBSTRUCTIVE SLEEP APNEA): ICD-10-CM

## 2021-11-22 DIAGNOSIS — E66.812 CLASS 2 SEVERE OBESITY DUE TO EXCESS CALORIES WITH SERIOUS COMORBIDITY AND BODY MASS INDEX (BMI) OF 36.0 TO 36.9 IN ADULT (H): ICD-10-CM

## 2021-11-22 DIAGNOSIS — I10 ESSENTIAL HYPERTENSION, BENIGN: ICD-10-CM

## 2021-11-22 LAB
ATRIAL RATE - MUSE: 49 BPM
DIASTOLIC BLOOD PRESSURE - MUSE: NORMAL MMHG
INTERPRETATION ECG - MUSE: NORMAL
P AXIS - MUSE: 26 DEGREES
PR INTERVAL - MUSE: 192 MS
QRS DURATION - MUSE: 88 MS
QT - MUSE: 462 MS
QTC - MUSE: 417 MS
R AXIS - MUSE: 1 DEGREES
SYSTOLIC BLOOD PRESSURE - MUSE: NORMAL MMHG
T AXIS - MUSE: 67 DEGREES
VENTRICULAR RATE- MUSE: 49 BPM

## 2021-11-22 PROCEDURE — 99214 OFFICE O/P EST MOD 30 MIN: CPT | Performed by: INTERNAL MEDICINE

## 2021-11-22 PROCEDURE — 93000 ELECTROCARDIOGRAM COMPLETE: CPT | Performed by: INTERNAL MEDICINE

## 2021-11-22 NOTE — PATIENT INSTRUCTIONS
Ms Hedy PARISH Haq,  I enjoyed visiting with you again today.  I am glad to hear you are doing well.  Per our conversation no changes but if shortness of breath or light headed gets worse let me know.  I will plan on seeing you 1 year or sooner if needed.  Wero Macias

## 2021-11-22 NOTE — LETTER
11/22/2021    Arun Good MD  [facility] [address]  [place] [address]    RE: Hedy Haq       Dear Colleague,    I had the pleasure of seeing Hedy Haq in the [facility].      [facility]  Heart Care Clinic Follow-up Note    Assessment & Plan        (I71.2) Thoracic aortic aneurysm without rupture (H)  (primary encounter diagnosis)  Comment: based on CAT scan 4.7 x 4.6 which is unchanged from 17 months prior.  Surgery reviewed and feel it is more like 5.1 and some mild aortic insufficiency of a tricuspid valve.  They agree with watchful waiting to control blood pressure and should it increase up to 5.5 then surgery at that time.  Repeat CAT scan 4.8.  Stable with metoprolol at 25 mg p.o. twice daily.    (I10) Essential hypertension, benign  Comment: Under excellent control on benazepril, metoprolol, HCTZ.    (I48.0) Paroxysmal atrial fibrillation (H)  Comment: truly symptomatic, and persistent and she is in sinus rhythm today. Given this, Eliquis 5 mg p.o. twice daily for  [id number] score 2. No structural heart disease on echo, renal profile shows potassium is above 4 given HCTZ, as well as TSH normal given history of thyroid problem.    Normal nuclear exercise stress test so no issues with flecainide use round-the-clock 25 mg p.o. twice daily.  Given the aortic root enlargement would like to switch her off of this eventually to sotalol, will start metoprolol 12.5 mg p.o. twice daily and if tolerated consider switching this and Tambocor over to sotalol 80 mg p.o. twice daily.  If frequent atrial fibrillation consider ablation.    (E66.01,  Z68.36) Class 2 severe obesity due to excess calories with serious comorbidity and body mass index (BMI) of 36.0 to 36.9 in adult (H)  Comment: Work on weight loss, patient states she's lost up to 75 pounds in past.    (G47.33) KAREN (obstructive sleep  apnea)  Comment: Defer to primary but strongly recommend sleep study.    Pure hypercholesterolemia-seeing primary discomfort Friday for blood test.    Acquired hypothyroidism-seen primary for blood test this Friday.    Plan  1.  Weight loss.  2.  Strongly recommend sleep study.  3.  Consider switching metoprolol and Tambocor over to sotalol, at this point time hold steady current meds.  4.  Follow-up me in 1 year with repeat CAT scan or sooner if needed.    Subjective  CC: 61-year-old white female being seen in follow-up today.  Since I seen her she is completely retired, she tells me soon as I started metoprolol she started getting short of breath after going for walks or up a flight of steps or sometimes at rest.  Shortness of breath is less than to the point is only going up a flight of steps.  She tells me she gets A. fib, maybe once or twice a day for less than a minute each.  In general though no significant chest discomfort, sustained palpitations, significant shortness breath, PND, orthopnea or peripheral edema.    Medications  Current Outpatient Medications   Medication Sig Dispense Refill     apixaban ANTICOAGULANT (ELIQUIS) 5 mg Tab tablet [APIXABAN ANTICOAGULANT (ELIQUIS) 5 MG TAB TABLET] Take 5 mg by mouth 2 (two) times a day.       benazepriL (LOTENSIN) 20 MG tablet [BENAZEPRIL (LOTENSIN) 20 MG TABLET] Take 1 tablet (20 mg total) by mouth daily. 90 tablet 1     flecainide (TAMBOCOR) 50 MG tablet Take 0.5 tablets (25 mg) by mouth 2 times daily 90 tablet 0     hydroCHLOROthiazide (HYDRODIURIL) 12.5 MG tablet [HYDROCHLOROTHIAZIDE (HYDRODIURIL) 12.5 MG TABLET] Take 12.5 mg by mouth daily.       levothyroxine (SYNTHROID, LEVOTHROID) 175 MCG tablet [LEVOTHYROXINE (SYNTHROID, LEVOTHROID) 175 MCG TABLET] Take 175 mcg by mouth daily.       metoprolol tartrate (LOPRESSOR) 25 MG tablet [METOPROLOL TARTRATE (LOPRESSOR) 25 MG TABLET] Take 1 tablet (25 mg total) by mouth 2 (two) times a day. 180 tablet 2      prednisoLONE acetate (PRED-FORTE) 1 % ophthalmic suspension [PREDNISOLONE ACETATE (PRED-FORTE) 1 % OPHTHALMIC SUSPENSION] 1 drop daily.          Objective  /72 (BP Location: Left arm, Patient Position: Sitting, Cuff Size: Adult Large)   Pulse 56   Resp 12   Wt 127.9 kg (282 lb)   BMI 44.17 kg/m      General Appearance:    Alert, cooperative, no distress, appears stated age   Head:    Normocephalic, without obvious abnormality, atraumatic   Throat:   Lips, mucosa, and tongue normal; teeth and gums normal   Neck:   Supple, symmetrical, trachea midline, no adenopathy;        thyroid:  No enlargement/tenderness/nodules; no carotid    bruit or JVD   Back:     Symmetric, no curvature, ROM normal, no CVA tenderness   Lungs:     Clear to auscultation bilaterally, respirations unlabored   Chest wall:    No tenderness or deformity   Heart:    Regular rate and rhythm, S1 and S2 normal, no murmur, rub   or gallop   Abdomen:     Soft, non-tender, bowel sounds active all four quadrants,     no masses, no organomegaly   Extremities:   Normal, atraumatic, no cyanosis or edema   Pulses:   2+ and symmetric all extremities   Skin:   Skin color, texture, turgor normal, no rashes or lesions     Results    Lab Results personally reviewed   Lab Results   Component Value Date    CHOL 224 (H) 11/20/2020    CHOL 208 (H) 11/11/2019     Lab Results   Component Value Date    HDL 72 11/20/2020    HDL 71 11/11/2019     No components found for: LDLCALC  Lab Results   Component Value Date    TRIG 110 11/20/2020    TRIG 105 11/11/2019     No results found for: WBC, HGB, HCT, PLT  Lab Results   Component Value Date    BUN 11 11/20/2020     11/20/2020    CO2 30 11/20/2020       Reviewed electrocardiogram sinus bradycardia, normal intervals, no acute changes.

## 2021-11-22 NOTE — PROGRESS NOTES
[facility]  Heart Care Clinic Follow-up Note    Assessment & Plan        (I71.2) Thoracic aortic aneurysm without rupture (H)  (primary encounter diagnosis)  Comment: based on CAT scan 4.7 x 4.6 which is unchanged from 17 months prior.  Surgery reviewed and feel it is more like 5.1 and some mild aortic insufficiency of a tricuspid valve.  They agree with watchful waiting to control blood pressure and should it increase up to 5.5 then surgery at that time.  Repeat CAT scan 4.8.  Stable with metoprolol at 25 mg p.o. twice daily.    (I10) Essential hypertension, benign  Comment: Under excellent control on benazepril, metoprolol, HCTZ.    (I48.0) Paroxysmal atrial fibrillation (H)  Comment: truly symptomatic, and persistent and she is in sinus rhythm today. Given this, Eliquis 5 mg p.o. twice daily for  [id number] score 2. No structural heart disease on echo, renal profile shows potassium is above 4 given HCTZ, as well as TSH normal given history of thyroid problem.    Normal nuclear exercise stress test so no issues with flecainide use round-the-clock 25 mg p.o. twice daily.  Given the aortic root enlargement would like to switch her off of this eventually to sotalol, will start metoprolol 12.5 mg p.o. twice daily and if tolerated consider switching this and Tambocor over to sotalol 80 mg p.o. twice daily.  If frequent atrial fibrillation consider ablation.    (E66.01,  Z68.36) Class 2 severe obesity due to excess calories with serious comorbidity and body mass index (BMI) of 36.0 to 36.9 in adult (H)  Comment: Work on weight loss, patient states she's lost up to 75 pounds in past.    (G47.33) KAREN (obstructive sleep apnea)  Comment: Defer to primary but strongly recommend sleep study.    Pure hypercholesterolemia-seeing primary discomfort Friday for blood test.    Acquired hypothyroidism-seen primary for blood test this Friday.    Plan  1.  Weight loss.  2.  Strongly recommend sleep study.  3.  Consider  switching metoprolol and Tambocor over to sotalol, at this point time hold steady current meds.  4.  Follow-up me in 1 year with repeat CAT scan or sooner if needed.    Subjective  CC: 61-year-old white female being seen in follow-up today.  Since I seen her she is completely retired, she tells me soon as I started metoprolol she started getting short of breath after going for walks or up a flight of steps or sometimes at rest.  Shortness of breath is less than to the point is only going up a flight of steps.  She tells me she gets A. fib, maybe once or twice a day for less than a minute each.  In general though no significant chest discomfort, sustained palpitations, significant shortness breath, PND, orthopnea or peripheral edema.    Medications  Current Outpatient Medications   Medication Sig Dispense Refill     apixaban ANTICOAGULANT (ELIQUIS) 5 mg Tab tablet [APIXABAN ANTICOAGULANT (ELIQUIS) 5 MG TAB TABLET] Take 5 mg by mouth 2 (two) times a day.       benazepriL (LOTENSIN) 20 MG tablet [BENAZEPRIL (LOTENSIN) 20 MG TABLET] Take 1 tablet (20 mg total) by mouth daily. 90 tablet 1     flecainide (TAMBOCOR) 50 MG tablet Take 0.5 tablets (25 mg) by mouth 2 times daily 90 tablet 0     hydroCHLOROthiazide (HYDRODIURIL) 12.5 MG tablet [HYDROCHLOROTHIAZIDE (HYDRODIURIL) 12.5 MG TABLET] Take 12.5 mg by mouth daily.       levothyroxine (SYNTHROID, LEVOTHROID) 175 MCG tablet [LEVOTHYROXINE (SYNTHROID, LEVOTHROID) 175 MCG TABLET] Take 175 mcg by mouth daily.       metoprolol tartrate (LOPRESSOR) 25 MG tablet [METOPROLOL TARTRATE (LOPRESSOR) 25 MG TABLET] Take 1 tablet (25 mg total) by mouth 2 (two) times a day. 180 tablet 2     prednisoLONE acetate (PRED-FORTE) 1 % ophthalmic suspension [PREDNISOLONE ACETATE (PRED-FORTE) 1 % OPHTHALMIC SUSPENSION] 1 drop daily.          Objective  /72 (BP Location: Left arm, Patient Position: Sitting, Cuff Size: Adult Large)   Pulse 56   Resp 12   Wt 127.9 kg (282 lb)   BMI 44.17  kg/m      General Appearance:    Alert, cooperative, no distress, appears stated age   Head:    Normocephalic, without obvious abnormality, atraumatic   Throat:   Lips, mucosa, and tongue normal; teeth and gums normal   Neck:   Supple, symmetrical, trachea midline, no adenopathy;        thyroid:  No enlargement/tenderness/nodules; no carotid    bruit or JVD   Back:     Symmetric, no curvature, ROM normal, no CVA tenderness   Lungs:     Clear to auscultation bilaterally, respirations unlabored   Chest wall:    No tenderness or deformity   Heart:    Regular rate and rhythm, S1 and S2 normal, no murmur, rub   or gallop   Abdomen:     Soft, non-tender, bowel sounds active all four quadrants,     no masses, no organomegaly   Extremities:   Normal, atraumatic, no cyanosis or edema   Pulses:   2+ and symmetric all extremities   Skin:   Skin color, texture, turgor normal, no rashes or lesions     Results    Lab Results personally reviewed   Lab Results   Component Value Date    CHOL 224 (H) 11/20/2020    CHOL 208 (H) 11/11/2019     Lab Results   Component Value Date    HDL 72 11/20/2020    HDL 71 11/11/2019     No components found for: LDLCALC  Lab Results   Component Value Date    TRIG 110 11/20/2020    TRIG 105 11/11/2019     No results found for: WBC, HGB, HCT, PLT  Lab Results   Component Value Date    BUN 11 11/20/2020     11/20/2020    CO2 30 11/20/2020       Reviewed electrocardiogram sinus bradycardia, normal intervals, no acute changes.

## 2021-11-26 ENCOUNTER — LAB REQUISITION (OUTPATIENT)
Dept: LAB | Facility: CLINIC | Age: 61
End: 2021-11-26

## 2021-11-26 DIAGNOSIS — E78.2 MIXED HYPERLIPIDEMIA: ICD-10-CM

## 2021-11-26 DIAGNOSIS — E03.9 HYPOTHYROIDISM, UNSPECIFIED: ICD-10-CM

## 2021-11-26 DIAGNOSIS — I10 ESSENTIAL (PRIMARY) HYPERTENSION: ICD-10-CM

## 2021-11-26 LAB
ALBUMIN SERPL-MCNC: 4 G/DL (ref 3.5–5)
ALP SERPL-CCNC: 85 U/L (ref 45–120)
ALT SERPL W P-5'-P-CCNC: 18 U/L (ref 0–45)
ANION GAP SERPL CALCULATED.3IONS-SCNC: 11 MMOL/L (ref 5–18)
AST SERPL W P-5'-P-CCNC: 15 U/L (ref 0–40)
BILIRUB SERPL-MCNC: 0.6 MG/DL (ref 0–1)
BUN SERPL-MCNC: 13 MG/DL (ref 8–22)
CALCIUM SERPL-MCNC: 9.9 MG/DL (ref 8.5–10.5)
CHLORIDE BLD-SCNC: 103 MMOL/L (ref 98–107)
CHOLEST SERPL-MCNC: 238 MG/DL
CO2 SERPL-SCNC: 26 MMOL/L (ref 22–31)
CREAT SERPL-MCNC: 0.78 MG/DL (ref 0.6–1.1)
FASTING STATUS PATIENT QL REPORTED: ABNORMAL
GFR SERPL CREATININE-BSD FRML MDRD: 81 ML/MIN/1.73M2
GLUCOSE BLD-MCNC: 88 MG/DL (ref 70–125)
HDLC SERPL-MCNC: 70 MG/DL
LDLC SERPL CALC-MCNC: 140 MG/DL
POTASSIUM BLD-SCNC: 3.9 MMOL/L (ref 3.5–5)
PROT SERPL-MCNC: 7 G/DL (ref 6–8)
SODIUM SERPL-SCNC: 140 MMOL/L (ref 136–145)
TRIGL SERPL-MCNC: 138 MG/DL
TSH SERPL DL<=0.005 MIU/L-ACNC: 6.63 UIU/ML (ref 0.3–5)

## 2021-11-26 PROCEDURE — 80061 LIPID PANEL: CPT | Performed by: FAMILY MEDICINE

## 2021-11-26 PROCEDURE — 80053 COMPREHEN METABOLIC PANEL: CPT | Performed by: FAMILY MEDICINE

## 2021-11-26 PROCEDURE — 84443 ASSAY THYROID STIM HORMONE: CPT | Performed by: FAMILY MEDICINE

## 2021-12-01 NOTE — PROGRESS NOTES
Can we get message to patient's primary care physician that given the cholesterol of 238 with a LDL of 140 and aortic enlargement I would favor low-dose statin therapy such as atorvastatin 20 mg a day?  I defer to primary to start that.  LF

## 2021-12-06 ENCOUNTER — TELEPHONE (OUTPATIENT)
Dept: CARDIOLOGY | Facility: CLINIC | Age: 61
End: 2021-12-06
Payer: COMMERCIAL

## 2021-12-06 RX ORDER — LEVOTHYROXINE SODIUM 200 UG/1
200 TABLET ORAL DAILY
COMMUNITY
End: 2023-06-05 | Stop reason: DRUGHIGH

## 2021-12-06 NOTE — TELEPHONE ENCOUNTER
Author: Wero Macias MD Service: -- Author Type: Physician   Filed: 12/1/2021 10:55 AM Encounter Date: 12/1/2021 Status: Signed   : Wero Macias MD (Physician)            []Hide copied text    []Hover for details    Can we get message to patient's primary care physician that given the cholesterol of 238 with a LDL of 140 and aortic enlargement I would favor low-dose statin therapy such as atorvastatin 20 mg a day?  I defer to primary to start that.                  Telephone encounter created- faxed this encounter with LBF review and recommendations to Dr. Good with [facility] to their medical records team at [phone]. Called Hedy and updated her that we received this information. She mentioned that her TSH was also in the faxed blood work and her Levothyroxine was increased to 200 mcg. Med list updated. She was not started on statin by Dr. Good and has been on Atorvastatin and Simvastatin in the past. She was intolerant of them and this caused her significant muscle aches. She will plan to discuss other options with him- LDL unacceptable at 140. [place] [place]

## 2022-01-06 ENCOUNTER — TRANSFERRED RECORDS (OUTPATIENT)
Dept: HEALTH INFORMATION MANAGEMENT | Facility: CLINIC | Age: 62
End: 2022-01-06

## 2022-01-06 ENCOUNTER — LAB REQUISITION (OUTPATIENT)
Dept: LAB | Facility: CLINIC | Age: 62
End: 2022-01-06
Payer: COMMERCIAL

## 2022-01-06 DIAGNOSIS — R05.9 COUGH, UNSPECIFIED: ICD-10-CM

## 2022-01-06 PROCEDURE — U0003 INFECTIOUS AGENT DETECTION BY NUCLEIC ACID (DNA OR RNA); SEVERE ACUTE RESPIRATORY SYNDROME CORONAVIRUS 2 (SARS-COV-2) (CORONAVIRUS DISEASE [COVID-19]), AMPLIFIED PROBE TECHNIQUE, MAKING USE OF HIGH THROUGHPUT TECHNOLOGIES AS DESCRIBED BY CMS-2020-01-R: HCPCS | Mod: ORL | Performed by: PHYSICIAN ASSISTANT

## 2022-01-07 LAB — SARS-COV-2 RNA RESP QL NAA+PROBE: NEGATIVE

## 2022-03-01 ENCOUNTER — LAB REQUISITION (OUTPATIENT)
Dept: LAB | Facility: CLINIC | Age: 62
End: 2022-03-01

## 2022-03-01 DIAGNOSIS — E03.9 HYPOTHYROIDISM, UNSPECIFIED: ICD-10-CM

## 2022-03-01 LAB — TSH SERPL DL<=0.005 MIU/L-ACNC: 5.84 UIU/ML (ref 0.3–5)

## 2022-03-01 PROCEDURE — 84443 ASSAY THYROID STIM HORMONE: CPT | Performed by: FAMILY MEDICINE

## 2022-03-15 DIAGNOSIS — I48.0 PAROXYSMAL ATRIAL FIBRILLATION (H): ICD-10-CM

## 2022-03-15 DIAGNOSIS — I71.20 THORACIC AORTIC ANEURYSM WITHOUT RUPTURE (H): ICD-10-CM

## 2022-03-15 RX ORDER — METOPROLOL TARTRATE 25 MG/1
TABLET, FILM COATED ORAL
Qty: 180 TABLET | Refills: 2 | Status: SHIPPED | OUTPATIENT
Start: 2022-03-15 | End: 2024-06-03

## 2022-03-18 ENCOUNTER — HOSPITAL ENCOUNTER (EMERGENCY)
Facility: HOSPITAL | Age: 62
Discharge: HOME | End: 2022-03-18
Attending: EMERGENCY MEDICINE | Admitting: EMERGENCY MEDICINE
Payer: COMMERCIAL

## 2022-03-18 ENCOUNTER — APPOINTMENT (OUTPATIENT)
Dept: RADIOLOGY | Facility: HOSPITAL | Age: 62
End: 2022-03-18
Attending: EMERGENCY MEDICINE
Payer: COMMERCIAL

## 2022-03-18 ENCOUNTER — APPOINTMENT (OUTPATIENT)
Dept: CT IMAGING | Facility: HOSPITAL | Age: 62
End: 2022-03-18
Attending: EMERGENCY MEDICINE
Payer: COMMERCIAL

## 2022-03-18 VITALS
OXYGEN SATURATION: 96 % | RESPIRATION RATE: 20 BRPM | DIASTOLIC BLOOD PRESSURE: 77 MMHG | HEART RATE: 52 BPM | TEMPERATURE: 98.4 F | SYSTOLIC BLOOD PRESSURE: 166 MMHG

## 2022-03-18 DIAGNOSIS — Z79.01 CHRONIC ANTICOAGULATION: ICD-10-CM
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
## 2022-03-18 DIAGNOSIS — S52.572A OTHER CLOSED INTRA-ARTICULAR FRACTURE OF DISTAL END OF LEFT RADIUS, INITIAL ENCOUNTER: ICD-10-CM

## 2022-03-18 LAB
ANION GAP SERPL CALCULATED.3IONS-SCNC: 12 MMOL/L (ref 5–18)
ATRIAL RATE - MUSE: 54 BPM
BASOPHILS # BLD AUTO: 0 10E3/UL (ref 0–0.2)
BASOPHILS NFR BLD AUTO: 1 %
BUN SERPL-MCNC: 11 MG/DL (ref 8–22)
CALCIUM SERPL-MCNC: 9.2 MG/DL (ref 8.5–10.5)
CHLORIDE BLD-SCNC: 106 MMOL/L (ref 98–107)
CO2 SERPL-SCNC: 23 MMOL/L (ref 22–31)
CREAT SERPL-MCNC: 0.79 MG/DL (ref 0.6–1.1)
DIASTOLIC BLOOD PRESSURE - MUSE: NORMAL MMHG
EOSINOPHIL # BLD AUTO: 0.1 10E3/UL (ref 0–0.7)
EOSINOPHIL NFR BLD AUTO: 2 %
ERYTHROCYTE [DISTWIDTH] IN BLOOD BY AUTOMATED COUNT: 13.5 % (ref 10–15)
GFR SERPL CREATININE-BSD FRML MDRD: 84 ML/MIN/1.73M2
GLUCOSE BLD-MCNC: 139 MG/DL (ref 70–125)
HCT VFR BLD AUTO: 41.9 % (ref 35–47)
HGB BLD-MCNC: 13.6 G/DL (ref 11.7–15.7)
HOLD SPECIMEN: NORMAL
IMM GRANULOCYTES # BLD: 0 10E3/UL
IMM GRANULOCYTES NFR BLD: 0 %
INTERPRETATION ECG - MUSE: NORMAL
LYMPHOCYTES # BLD AUTO: 2.7 10E3/UL (ref 0.8–5.3)
LYMPHOCYTES NFR BLD AUTO: 35 %
MAGNESIUM SERPL-MCNC: 1.7 MG/DL (ref 1.8–2.6)
MCH RBC QN AUTO: 28.9 PG (ref 26.5–33)
MCHC RBC AUTO-ENTMCNC: 32.5 G/DL (ref 31.5–36.5)
MCV RBC AUTO: 89 FL (ref 78–100)
MONOCYTES # BLD AUTO: 0.6 10E3/UL (ref 0–1.3)
MONOCYTES NFR BLD AUTO: 8 %
NEUTROPHILS # BLD AUTO: 4.2 10E3/UL (ref 1.6–8.3)
NEUTROPHILS NFR BLD AUTO: 54 %
NRBC # BLD AUTO: 0 10E3/UL
NRBC BLD AUTO-RTO: 0 /100
P AXIS - MUSE: 52 DEGREES
PLATELET # BLD AUTO: 244 10E3/UL (ref 150–450)
POTASSIUM BLD-SCNC: 3.5 MMOL/L (ref 3.5–5)
PR INTERVAL - MUSE: 206 MS
QRS DURATION - MUSE: 90 MS
QT - MUSE: 482 MS
QTC - MUSE: 457 MS
R AXIS - MUSE: 9 DEGREES
RBC # BLD AUTO: 4.7 10E6/UL (ref 3.8–5.2)
SODIUM SERPL-SCNC: 141 MMOL/L (ref 136–145)
SYSTOLIC BLOOD PRESSURE - MUSE: NORMAL MMHG
T AXIS - MUSE: 54 DEGREES
VENTRICULAR RATE- MUSE: 54 BPM
WBC # BLD AUTO: 7.6 10E3/UL (ref 4–11)

## 2022-03-18 PROCEDURE — 250N000013 HC RX MED GY IP 250 OP 250 PS 637: Performed by: EMERGENCY MEDICINE

## 2022-03-18 PROCEDURE — 93005 ELECTROCARDIOGRAM TRACING: CPT | Performed by: EMERGENCY MEDICINE

## 2022-03-18 PROCEDURE — 70450 CT HEAD/BRAIN W/O DYE: CPT

## 2022-03-18 PROCEDURE — 25600 CLTX DST RDL FX/EPHYS SEP WO: CPT | Mod: LT

## 2022-03-18 PROCEDURE — 36415 COLL VENOUS BLD VENIPUNCTURE: CPT | Performed by: STUDENT IN AN ORGANIZED HEALTH CARE EDUCATION/TRAINING PROGRAM

## 2022-03-18 PROCEDURE — 85025 COMPLETE CBC W/AUTO DIFF WBC: CPT | Performed by: EMERGENCY MEDICINE

## 2022-03-18 PROCEDURE — 99285 EMERGENCY DEPT VISIT HI MDM: CPT | Mod: 25

## 2022-03-18 PROCEDURE — 82374 ASSAY BLOOD CARBON DIOXIDE: CPT | Performed by: EMERGENCY MEDICINE

## 2022-03-18 PROCEDURE — 83735 ASSAY OF MAGNESIUM: CPT | Performed by: EMERGENCY MEDICINE

## 2022-03-18 PROCEDURE — 73110 X-RAY EXAM OF WRIST: CPT | Mod: LT

## 2022-03-18 RX ORDER — ACETAMINOPHEN 325 MG/1
975 TABLET ORAL ONCE
Status: COMPLETED | OUTPATIENT
Start: 2022-03-18 | End: 2022-03-18

## 2022-03-18 RX ORDER — ACETAMINOPHEN 325 MG/1
650 TABLET ORAL ONCE
Status: DISCONTINUED | OUTPATIENT
Start: 2022-03-18 | End: 2022-03-18

## 2022-03-18 RX ADMIN — ACETAMINOPHEN 975 MG: 325 TABLET ORAL at 13:04

## 2022-03-18 ASSESSMENT — ENCOUNTER SYMPTOMS
ARTHRALGIAS: 1
HEADACHES: 1
MYALGIAS: 1
WOUND: 1

## 2022-03-18 NOTE — ED PROVIDER NOTES
Emergency Department Encounter     Evaluation Date & Time:   3/18/2022 11:30 AM    CHIEF COMPLAINT:  Fall      Triage Note:The pt arrives via EMS after falling down stairs at a hotel. Per EMS pt had a witnessed LOC for several minutes. Pt is now alert but does not remember the fall. Is on blood thinners, trauma alert called.       FINAL IMPRESSION:    ICD-10-CM    1. Fall down stairs, initial encounter  W10.8XXA    2. Other closed intra-articular fracture of distal end of left radius, initial encounter  S52.572A        Impression and Plan     ED COURSE & MEDICAL DECISION MAKIN:33 AM I met with the patient to gather history and to perform my initial exam. I discussed the plan for care while in the Emergency Department. PPE (surgical cap, N95 mask) was worn by me during patient encounters while patient wore mask.   1:07 PM I re-evaluated and updated patient. Splint was placed.  1:19 PM We discussed plans for discharge and patient is agreeable. Patient declined any narcotic pain medication.    ED Course as of 22 1321   Fri Mar 18, 2022   1142 Patient appears to have landed primarily on her left side and has no signs of head trauma.  She is quite awake and alert neurologically intact and does not have any tenderness along her spine so no further imaging they are indicated.  She may have a wrist fracture, or scaphoid fracture based on her examination.  Otherwise there is no proximal discomfort to suggest separation approximately.  No other recent illnesses, but she was briefly out of its we will do EKG and blood work to look for any other potential triggers then a slip and fall.  She does remember suddenly finding herself falling, but does not recall slipping on the stairs.       At the conclusion of the encounter I discussed the results of all the tests and the disposition. The questions were answered. The patient or family acknowledged understanding and was agreeable with the care plan.          0 minutes  of critical care time        MEDICATIONS GIVEN IN THE EMERGENCY DEPARTMENT:  Medications   acetaminophen (TYLENOL) tablet 975 mg (975 mg Oral Given 3/18/22 1304)       NEW PRESCRIPTIONS STARTED AT TODAY'S ED VISIT:  New Prescriptions    No medications on file       HPI     HPI     Hedy Haq is a 61 year old female with a pertinent history of essential hypertension, hypothyroidism, mixed hyperlipidemia, and paroxysmal atrial fibrillation, who presents to this ED by EMS for evaluation of a fall.     The patient reports she was walking down a flight of stairs when she suddenly felt herself begin to fall. At the time of the fall, the patient was at a local hotel on her way to babysit her grandchildren when hotel staff witnessed her fall. She reportedly fell down 7 stairs and was unresponsive for 2-3 minutes after the fall. The patient is unsure what the cause of the fall was but suspects it was a mechanical fall. She is unsure if she hit her head, and notes she doesn't remember much following the fall. Patient does take Eliquis. The patient reports a headache and left wrist pain. She also reports she was also generally sore prior to the fall due to cleaning water out of her basement in her home. Patient denies any facial pain. She does note an abrasion to left lower leg. No additional medical concerns or complaints at this time.    REVIEW OF SYSTEMS:  Review of Systems   HENT:        Negative for facial pain.   Musculoskeletal: Positive for arthralgias (left wrist) and myalgias (general soreness).   Skin: Positive for wound (abrasion below left knee).   Neurological: Positive for headaches.        Positive for loss of consciousness     remainder of systems are all otherwise negative.    Medical History     No past medical history on file.    No past surgical history on file.    No family history on file.    Social History     Tobacco Use     Smoking status: Never Smoker     Smokeless tobacco: Never Used    Substance Use Topics     Alcohol use: Not on file     Drug use: Never       apixaban ANTICOAGULANT (ELIQUIS) 5 mg Tab tablet  benazepril (LOTENSIN) 20 MG tablet  flecainide (TAMBOCOR) 50 MG tablet  hydroCHLOROthiazide (HYDRODIURIL) 12.5 MG tablet  levothyroxine (SYNTHROID/LEVOTHROID) 200 MCG tablet  metoprolol tartrate (LOPRESSOR) 25 MG tablet  prednisoLONE acetate (PRED-FORTE) 1 % ophthalmic suspension        Physical Exam     First Vitals:  Patient Vitals for the past 24 hrs:   BP Temp Temp src Pulse Resp SpO2   03/18/22 1230 (!) 147/65 -- -- 52 21 97 %   03/18/22 1221 (!) 160/72 -- -- 52 13 96 %   03/18/22 1134 (!) 170/85 98.4  F (36.9  C) Oral 71 18 96 %       PHYSICAL EXAM:   Constitutional:  Patient is lying in bed  Eyes:  PERRLA bilaterally, no hyphema, no diplopia.   HENT:  NCAT, canals clear, no lacerations, no hemotympanum, no septal hematoma, oropharynx clear, no blood in posterior pharynx, teeth intact, trachea midline, dried blood in right naris. Facial bones nontender  Spine:  C-Collar in place but ineffective, patient is moving her neck in it and her chin is under the chin strap, C-Collar removed during exam. Midline spine is nontender to palpation from occiput through sacrum  Respiratory:  Clear to auscultation, equal breath sounds bilaterally, no subcutaneous air, atraumatic chest wall, stable chest wall to ap and lateral palpation, clear to ascultation   Cardiovascular:   RRR S1 S2, no murmurs or friction rubs, No JVD, pulses are equal and symmetrically strong in all extremities  Abdomen:  Soft, Non-distended, non-tender, atraumatic,  Pelvis:  Stable, nontender to ap and lateral compression and to rock.  Musculoskeletal: Left wrist swelling and tenderness over the base of thumb on dorsal surface, remaining metacarpales nontender, distal radius tender, left elbow and shoulder are nontender to palpation and have full range of motion. Remaining extremities nontender with good range of motion.    Integument:  Superficial abrasion just under left knee. Abrasion on left inguinal fold.   Neurologic:  Awake, Alert, and Oriented x3, GCS 15, diffusely normal sensation including perirectally, spontaneously able to move all extremities      Results     LAB AND RADIOLOGY:  All pertinent labs reviewed and interpreted  Results for orders placed or performed during the hospital encounter of 03/18/22   Head CT w/o contrast     Status: None    Narrative    EXAM: CT HEAD W/O CONTRAST  LOCATION: [facility]  DATE/TIME: 3/18/2022 11:55 AM    INDICATION: Loss of consciousness  COMPARISON: None.  TECHNIQUE: Routine CT Head without IV contrast. Multiplanar reformats. Dose reduction techniques were used.    FINDINGS:  INTRACRANIAL CONTENTS: No evidence of acute intracranial hemorrhage or mass effect. Scattered foci of decreased attenuation within the cerebral hemispheric white matter which are nonspecific, though most commonly ascribed to chronic small vessel ischemic   disease. The ventricles and sulci are normal for age. Normal gray-white matter differentiation is maintained. The basilar cisterns are patent.    VISUALIZED ORBITS/SINUSES/MASTOIDS: Bilateral cataract surgery. Visualized portions of the orbits are otherwise unremarkable.     BONES/SOFT TISSUES: The visualized skull base and calvarium are unremarkable.      Impression    IMPRESSION:    1.  No evidence of acute intracranial hemorrhage or mass effect.  2.  Mild nonspecific white matter changes.  3.  Mild brain parenchymal volume loss.   XR Wrist Left G/E 3 Views     Status: None    Narrative    EXAM: XR WRIST LEFT G/E 3 VIEWS  LOCATION: [facility]  DATE/TIME: 3/18/2022 11:47 AM    INDICATION: fall down stairs, tender distal radius, base of thumb swollen dorsally  COMPARISON: None.      Impression    IMPRESSION: Acute mildly impacted intra-articular fracture of the distal radius. Small bone fragment over the dorsal  mid carpus likely a dorsal triquetral avulsion fracture. No dislocation. 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 Diffuse   bony demineralization   [place] Draw     Status: None    Narrative    The following orders were created for panel order [place] Draw.  Procedure                               Abnormality         Status                     ---------                               -----------         ------                     Extra Blue Top Tube[[id number]]                              Final result               Extra Red Top Tube[[id number]]                               Final result               Extra Green Top (Lithium...[[id number]]                      Final result               Extra Purple Top Tube[[id number]]                            Final result                 Please view results for these tests on the individual orders.   Extra Blue Top Tube     Status: None   Result Value Ref Range    Hold Specimen JIC    Extra Red Top Tube     Status: None   Result Value Ref Range    Hold Specimen JIC    Extra Green Top (Lithium Heparin) Tube     Status: None   Result Value Ref Range    Hold Specimen JIC    Extra Purple Top Tube     Status: None   Result Value Ref Range    Hold Specimen JIC    Basic metabolic panel     Status: Abnormal   Result Value Ref Range    Sodium 141 136 - 145 mmol/L    Potassium 3.5 3.5 - 5.0 mmol/L    Chloride 106 98 - 107 mmol/L    Carbon Dioxide (CO2) 23 22 - 31 mmol/L    Anion Gap 12 5 - 18 mmol/L    Urea Nitrogen 11 8 - 22 mg/dL    Creatinine 0.79 0.60 - 1.10 mg/dL    Calcium 9.2 8.5 - 10.5 mg/dL    Glucose 139 (H) 70 - 125 mg/dL    GFR Estimate 84 >60 mL/min/1.73m2   Magnesium     Status: Abnormal   Result Value Ref Range    Magnesium 1.7 (L) 1.8 - 2.6 mg/dL   CBC with platelets and differential     Status: None   Result Value Ref Range    WBC Count 7.6 4.0 - 11.0 10e3/uL    RBC Count 4.70 3.80 - 5.20 10e6/uL    Hemoglobin 13.6 11.7 - 15.7 g/dL    Hematocrit 41.9 35.0 -  47.0 %    MCV 89 78 - 100 fL    MCH 28.9 26.5 - 33.0 pg    MCHC 32.5 31.5 - 36.5 g/dL    RDW 13.5 10.0 - 15.0 %    Platelet Count 244 150 - 450 10e3/uL    % Neutrophils 54 %    % Lymphocytes 35 %    % Monocytes 8 %    % Eosinophils 2 %    % Basophils 1 %    % Immature Granulocytes 0 %    NRBCs per 100 WBC 0 <1 /100    Absolute Neutrophils 4.2 1.6 - 8.3 10e3/uL    Absolute Lymphocytes 2.7 0.8 - 5.3 10e3/uL    Absolute Monocytes 0.6 0.0 - 1.3 10e3/uL    Absolute Eosinophils 0.1 0.0 - 0.7 10e3/uL    Absolute Basophils 0.0 0.0 - 0.2 10e3/uL    Absolute Immature Granulocytes 0.0 <=0.4 10e3/uL    Absolute NRBCs 0.0 10e3/uL   CBC with platelets differential     Status: None    Narrative    The following orders were created for panel order CBC with platelets differential.  Procedure                               Abnormality         Status                     ---------                               -----------         ------                     CBC with platelets and d...[[id number]]                      Final result                 Please view results for these tests on the individual orders.       PROCEDURES:  Procedures:    PROCEDURE: Splint Placement   INDICATIONS: left distal radius fracture   PROCEDURE PROVIDER: Dr Patricia De La Fuente   NOTE:  A Dorsal Volar wrist splint made of fiberglass was applied to the Left upper extremity by the above provider. As noted in the physical exam, distal CMS was intact prior to placement. The splint was checked and the fit was adjusted to ensure proper positioning after placement. Sensation and circulation, as well as motor function, are unchanged after splint placement and the patient is more comfortable with the splint in place.  Secured with ace wrap.              The creation of this record is based on the scribe s observations of the work being performed by Caitlin Taveras and the provider s statements to them. This document has been checked and approved by MD Patricia Bañuelos  MD Sudhakar  Emergency Medicine  [facility] EMERGENCY DEPARTMENT         Patricia De La Fuente MD  03/18/22 3781

## 2022-03-18 NOTE — ED NOTES
Bed: JNED-01  Expected date:   Expected time:   Means of arrival: Ambulance  Comments:  [facility]: Fall on thinners

## 2022-03-18 NOTE — ED TRIAGE NOTES
The pt arrives via EMS after falling down stairs at a hotel. Per EMS pt had a witnessed LOC for several minutes. Pt is now alert but does not remember the fall. Is on blood thinners, trauma alert called.

## 2022-03-21 ENCOUNTER — TRANSFERRED RECORDS (OUTPATIENT)
Dept: HEALTH INFORMATION MANAGEMENT | Facility: CLINIC | Age: 62
End: 2022-03-21
Payer: COMMERCIAL

## 2022-04-01 DIAGNOSIS — I48.0 PAROXYSMAL ATRIAL FIBRILLATION (H): ICD-10-CM

## 2022-04-01 RX ORDER — FLECAINIDE ACETATE 50 MG/1
TABLET ORAL
Qty: 90 TABLET | Refills: 0 | Status: SHIPPED | OUTPATIENT
Start: 2022-04-01 | End: 2022-06-29

## 2022-06-13 DIAGNOSIS — I71.20 THORACIC AORTIC ANEURYSM WITHOUT RUPTURE (H): ICD-10-CM

## 2022-06-14 RX ORDER — BENAZEPRIL HYDROCHLORIDE 20 MG/1
TABLET ORAL
Qty: 90 TABLET | Refills: 1 | Status: SHIPPED | OUTPATIENT
Start: 2022-06-14 | End: 2023-06-05 | Stop reason: DRUGHIGH

## 2022-10-01 ENCOUNTER — HEALTH MAINTENANCE LETTER (OUTPATIENT)
Age: 62
End: 2022-10-01

## 2022-11-14 ENCOUNTER — HOSPITAL ENCOUNTER (OUTPATIENT)
Dept: CT IMAGING | Facility: HOSPITAL | Age: 62
Discharge: HOME | End: 2022-11-14
Attending: INTERNAL MEDICINE | Admitting: INTERNAL MEDICINE
Payer: COMMERCIAL

## 2022-11-14 DIAGNOSIS — I71.20 THORACIC AORTIC ANEURYSM WITHOUT RUPTURE (H): ICD-10-CM

## 2022-11-14 PROCEDURE — 71260 CT THORAX DX C+: CPT

## 2022-11-14 PROCEDURE — 250N000011 HC RX IP 250 OP 636: Performed by: INTERNAL MEDICINE

## 2022-11-14 RX ORDER — IOPAMIDOL 755 MG/ML
100 INJECTION, SOLUTION INTRAVASCULAR ONCE
Status: COMPLETED | OUTPATIENT
Start: 2022-11-14 | End: 2022-11-14

## 2022-11-14 RX ADMIN — IOPAMIDOL 100 ML: 755 INJECTION, SOLUTION INTRAVENOUS at 13:35

## 2022-11-16 ENCOUNTER — OFFICE VISIT (OUTPATIENT)
Dept: CARDIOLOGY | Facility: CLINIC | Age: 62
End: 2022-11-16
Attending: INTERNAL MEDICINE
Payer: COMMERCIAL

## 2022-11-16 VITALS
SYSTOLIC BLOOD PRESSURE: 179 MMHG | HEIGHT: 67 IN | DIASTOLIC BLOOD PRESSURE: 84 MMHG | HEART RATE: 55 BPM | WEIGHT: 292 LBS | BODY MASS INDEX: 45.83 KG/M2 | RESPIRATION RATE: 16 BRPM

## 2022-11-16 DIAGNOSIS — E66.01 CLASS 2 SEVERE OBESITY DUE TO EXCESS CALORIES WITH SERIOUS COMORBIDITY IN ADULT, UNSPECIFIED BMI (H): ICD-10-CM

## 2022-11-16 DIAGNOSIS — I10 ESSENTIAL HYPERTENSION, BENIGN: ICD-10-CM

## 2022-11-16 DIAGNOSIS — E03.9 ACQUIRED HYPOTHYROIDISM: ICD-10-CM

## 2022-11-16 DIAGNOSIS — I48.0 PAROXYSMAL ATRIAL FIBRILLATION (H): ICD-10-CM

## 2022-11-16 DIAGNOSIS — E66.812 CLASS 2 SEVERE OBESITY DUE TO EXCESS CALORIES WITH SERIOUS COMORBIDITY IN ADULT, UNSPECIFIED BMI (H): ICD-10-CM

## 2022-11-16 DIAGNOSIS — I71.20 THORACIC AORTIC ANEURYSM WITHOUT RUPTURE, UNSPECIFIED PART (H): Primary | ICD-10-CM

## 2022-11-16 DIAGNOSIS — G47.33 OSA (OBSTRUCTIVE SLEEP APNEA): ICD-10-CM

## 2022-11-16 LAB
ATRIAL RATE - MUSE: 52 BPM
DIASTOLIC BLOOD PRESSURE - MUSE: NORMAL MMHG
INTERPRETATION ECG - MUSE: NORMAL
P AXIS - MUSE: 22 DEGREES
PR INTERVAL - MUSE: 190 MS
QRS DURATION - MUSE: 82 MS
QT - MUSE: 458 MS
QTC - MUSE: 425 MS
R AXIS - MUSE: -10 DEGREES
SYSTOLIC BLOOD PRESSURE - MUSE: NORMAL MMHG
T AXIS - MUSE: 50 DEGREES
VENTRICULAR RATE- MUSE: 52 BPM

## 2022-11-16 PROCEDURE — 99214 OFFICE O/P EST MOD 30 MIN: CPT | Performed by: INTERNAL MEDICINE

## 2022-11-16 PROCEDURE — 93000 ELECTROCARDIOGRAM COMPLETE: CPT | Performed by: INTERNAL MEDICINE

## 2022-11-16 NOTE — LETTER
11/16/2022    Arun Good MD  [address]  [place] [address]    RE: Hedy Haq       Dear Colleague,     I had the pleasure of seeing Hedy Haq in the [facility].      [facility]  Heart Care Clinic Follow-up Note    Assessment & Plan        (I71.20) Thoracic aortic aneurysm without rupture  (primary encounter diagnosis)  Comment: Based on CAT scan 4.7 x 4.6 which is unchanged from 17 months prior. Surgery reviewed and feel it is more like 5.1 and some mild aortic insufficiency of a trileaflet aortic valve.  They agree with watchful waiting to control blood pressure and should it increase up to 5.5 then surgery at that time.  Repeat CAT scan 4.7.  Stable but blood pressure elevated and will increase metoprolol to 50 mg twice a day, if not tolerated with an increase Lotensin up to 40 mg a day.      (I10) Essential hypertension, benign  Comment: As above, blood pressure significantly elevated, try metoprolol 50 mg by mouth twice a day.  If too bradycardic will then go up on Lotensin and back down on metoprolol.    (I48.0) Paroxysmal atrial fibrillation (H)  Comment:  Truly symptomatic, and persistent and she is in sinus rhythm today. Given this, Eliquis 5 mg p.o. twice daily for  MSH1VQ4-UOQx score 2. No structural heart disease on echo, renal profile shows potassium is above 4 given HCTZ, as well as TSH normal given history of thyroid problem.    Normal nuclear exercise stress test so no issues with flecainide use round-the-clock 25 mg p.o. twice daily.  Given the aortic root enlargement would like to switch her off of this eventually to sotalol, on metoprolol twice daily and if tolerated consider switching this and Tambocor over to sotalol 80 mg p.o. twice daily.  If frequent atrial fibrillation consider ablation.    (G47.33) KAREN (obstructive sleep apnea)  Comment: Most likely has sleep apnea, she has deferred sleep study, strongly recommend  addressing this.    (E66.01) Class 2 severe obesity due to excess calories with serious comorbidity in adult, unspecified BMI (H)  Comment: Strongly recommend weight loss.    (E03.9) Acquired hypothyroidism  Comment: Defer to primary but TSH 5.84.    Sinus bradycardia-most likely due to metoprolol.  As above go up on metoprolol slowly, suspect however sinus bradycardia worse in which case we will need to leave it at 25 twice a day and go up on the Lotensin as above.  In that case most likely will check renal profile with primary November 30 during her appointment there.  Plan  1.  Increase metoprolol to 50 mg the evening, after about 4 to 7 days then go to 50 mg twice a day, monitor blood pressure and bradycardia.  If too bradycardic will then need to go back down to 25 twice a day and increase Lotensin to 40 mg a day.  2.  Recheck chest CT in about a year.  3.  Follow-up with me in 6 months given blood pressure sooner if needed.    Subjective  CC: 60-year-old white female being seen in follow-up.  Since I saw her she thinks her foot Multaq caught on a flight of steps at her hotel and she fell down some steps.  She had syncope and woke up in the ambulance.  No bladder or bowel incontinence nor was any seizure activity.  She does admit to shortness of breath and heavy activity but no PND, orthopnea, chest pains, palpitations, or peripheral edema.    Medications  Current Outpatient Medications   Medication Sig Dispense Refill     apixaban ANTICOAGULANT (ELIQUIS) 5 mg Tab tablet [APIXABAN ANTICOAGULANT (ELIQUIS) 5 MG TAB TABLET] Take 5 mg by mouth 2 (two) times a day.       benazepril (LOTENSIN) 20 MG tablet TAKE 1 TABLET(20 MG) BY MOUTH DAILY 90 tablet 1     flecainide (TAMBOCOR) 50 MG tablet TAKE 1/2 TABLET(25 MG) BY MOUTH TWICE DAILY 90 tablet 1     hydroCHLOROthiazide (HYDRODIURIL) 12.5 MG tablet [HYDROCHLOROTHIAZIDE (HYDRODIURIL) 12.5 MG TABLET] Take 12.5 mg by mouth daily.       levothyroxine  "(SYNTHROID/LEVOTHROID) 200 MCG tablet Take 200 mcg by mouth daily       metoprolol tartrate (LOPRESSOR) 25 MG tablet TAKE 1 TABLET(25 MG) BY MOUTH TWICE DAILY 180 tablet 2       Objective  BP (!) 179/84 (BP Location: Right arm, Patient Position: Sitting, Cuff Size: Adult Large)   Pulse 55   Resp 16   Ht 1.702 m (5' 7\")   Wt 132.5 kg (292 lb)   BMI 45.73 kg/m      General Appearance:    Alert, cooperative, no distress, appears stated age   Head:    Normocephalic, without obvious abnormality, atraumatic   Throat:   Lips, mucosa, and tongue normal; teeth and gums normal   Neck:   Supple, symmetrical, trachea midline, no adenopathy;        thyroid:  No enlargement/tenderness/nodules; no carotid    bruit or JVD   Back:     Symmetric, no curvature, ROM normal, no CVA tenderness   Lungs:     Clear to auscultation bilaterally, respirations unlabored   Chest wall:    No tenderness or deformity   Heart:    Regular rate and rhythm, S1 and S2 normal, no murmur, rub   or gallop   Abdomen:     Soft, non-tender, bowel sounds active all four quadrants,     no masses, no organomegaly   Extremities:   Normal, atraumatic, no cyanosis or edema   Pulses:   2+ and symmetric all extremities   Skin:   Skin color, texture, turgor normal, no rashes or lesions     Results    Lab Results personally reviewed   Lab Results   Component Value Date    CHOL 238 (H) 11/26/2021    CHOL 224 (H) 11/20/2020     Lab Results   Component Value Date    HDL 70 11/26/2021    HDL 72 11/20/2020     No components found for: LDLCALC  Lab Results   Component Value Date    TRIG 138 11/26/2021    TRIG 110 11/20/2020     Lab Results   Component Value Date    WBC 7.6 03/18/2022    HGB 13.6 03/18/2022    HCT 41.9 03/18/2022     03/18/2022     Lab Results   Component Value Date    BUN 11 03/18/2022     03/18/2022    CO2 23 03/18/2022       Reviewed electrocardiogram sinus bradycardia, normal intervals and axis otherwise, probable borderline left " ventricular hypertrophy              Thank you for allowing me to participate in the care of your patient.      Sincerely,     NORMAN MACIAS MD     [facility]  cc:   Norman Macias MD  [address], [address]  [place] [address]

## 2022-11-16 NOTE — PROGRESS NOTES
[facility]  Heart Care Clinic Follow-up Note    Assessment & Plan        (I71.20) Thoracic aortic aneurysm without rupture  (primary encounter diagnosis)  Comment: Based on CAT scan 4.7 x 4.6 which is unchanged from 17 months prior. Surgery reviewed and feel it is more like 5.1 and some mild aortic insufficiency of a trileaflet aortic valve.  They agree with watchful waiting to control blood pressure and should it increase up to 5.5 then surgery at that time.  Repeat CAT scan 4.7.  Stable but blood pressure elevated and will increase metoprolol to 50 mg twice a day, if not tolerated with an increase Lotensin up to 40 mg a day.      (I10) Essential hypertension, benign  Comment: As above, blood pressure significantly elevated, try metoprolol 50 mg by mouth twice a day.  If too bradycardic will then go up on Lotensin and back down on metoprolol.    (I48.0) Paroxysmal atrial fibrillation (H)  Comment:  Truly symptomatic, and persistent and she is in sinus rhythm today. Given this, Eliquis 5 mg p.o. twice daily for  [id number] score 2. No structural heart disease on echo, renal profile shows potassium is above 4 given HCTZ, as well as TSH normal given history of thyroid problem.    Normal nuclear exercise stress test so no issues with flecainide use round-the-clock 25 mg p.o. twice daily.  Given the aortic root enlargement would like to switch her off of this eventually to sotalol, on metoprolol twice daily and if tolerated consider switching this and Tambocor over to sotalol 80 mg p.o. twice daily.  If frequent atrial fibrillation consider ablation.    (G47.33) KAREN (obstructive sleep apnea)  Comment: Most likely has sleep apnea, she has deferred sleep study, strongly recommend addressing this.    (E66.01) Class 2 severe obesity due to excess calories with serious comorbidity in adult, unspecified BMI (H)  Comment: Strongly recommend weight loss.    (E03.9) Acquired hypothyroidism  Comment: Defer to primary  but TSH 5.84.    Sinus bradycardia-most likely due to metoprolol.  As above go up on metoprolol slowly, suspect however sinus bradycardia worse in which case we will need to leave it at 25 twice a day and go up on the Lotensin as above.  In that case most likely will check renal profile with primary November 30 during her appointment there.  Plan  1.  Increase metoprolol to 50 mg the evening, after about 4 to 7 days then go to 50 mg twice a day, monitor blood pressure and bradycardia.  If too bradycardic will then need to go back down to 25 twice a day and increase Lotensin to 40 mg a day.  2.  Recheck chest CT in about a year.  3.  Follow-up with me in 6 months given blood pressure sooner if needed.    Subjective  CC: 60-year-old white female being seen in follow-up.  Since I saw her she thinks her foot [place] caught on a flight of steps at her hotel and she fell down some steps.  She had syncope and woke up in the ambulance.  No bladder or bowel incontinence nor was any seizure activity.  She does admit to shortness of breath and heavy activity but no PND, orthopnea, chest pains, palpitations, or peripheral edema.    Medications  Current Outpatient Medications   Medication Sig Dispense Refill     apixaban ANTICOAGULANT (ELIQUIS) 5 mg Tab tablet [APIXABAN ANTICOAGULANT (ELIQUIS) 5 MG TAB TABLET] Take 5 mg by mouth 2 (two) times a day.       benazepril (LOTENSIN) 20 MG tablet TAKE 1 TABLET(20 MG) BY MOUTH DAILY 90 tablet 1     flecainide (TAMBOCOR) 50 MG tablet TAKE 1/2 TABLET(25 MG) BY MOUTH TWICE DAILY 90 tablet 1     hydroCHLOROthiazide (HYDRODIURIL) 12.5 MG tablet [HYDROCHLOROTHIAZIDE (HYDRODIURIL) 12.5 MG TABLET] Take 12.5 mg by mouth daily.       levothyroxine (SYNTHROID/LEVOTHROID) 200 MCG tablet Take 200 mcg by mouth daily       metoprolol tartrate (LOPRESSOR) 25 MG tablet TAKE 1 TABLET(25 MG) BY MOUTH TWICE DAILY 180 tablet 2       Objective  BP (!) 179/84 (BP Location: Right arm, Patient Position: Sitting,  "Cuff Size: Adult Large)   Pulse 55   Resp 16   Ht 1.702 m (5' 7\")   Wt 132.5 kg (292 lb)   BMI 45.73 kg/m      General Appearance:    Alert, cooperative, no distress, appears stated age   Head:    Normocephalic, without obvious abnormality, atraumatic   Throat:   Lips, mucosa, and tongue normal; teeth and gums normal   Neck:   Supple, symmetrical, trachea midline, no adenopathy;        thyroid:  No enlargement/tenderness/nodules; no carotid    bruit or JVD   Back:     Symmetric, no curvature, ROM normal, no CVA tenderness   Lungs:     Clear to auscultation bilaterally, respirations unlabored   Chest wall:    No tenderness or deformity   Heart:    Regular rate and rhythm, S1 and S2 normal, no murmur, rub   or gallop   Abdomen:     Soft, non-tender, bowel sounds active all four quadrants,     no masses, no organomegaly   Extremities:   Normal, atraumatic, no cyanosis or edema   Pulses:   2+ and symmetric all extremities   Skin:   Skin color, texture, turgor normal, no rashes or lesions     Results    Lab Results personally reviewed   Lab Results   Component Value Date    CHOL 238 (H) 11/26/2021    CHOL 224 (H) 11/20/2020     Lab Results   Component Value Date    HDL 70 11/26/2021    HDL 72 11/20/2020     No components found for: LDLCALC  Lab Results   Component Value Date    TRIG 138 11/26/2021    TRIG 110 11/20/2020     Lab Results   Component Value Date    WBC 7.6 03/18/2022    HGB 13.6 03/18/2022    HCT 41.9 03/18/2022     03/18/2022     Lab Results   Component Value Date    BUN 11 03/18/2022     03/18/2022    CO2 23 03/18/2022       Reviewed electrocardiogram sinus bradycardia, normal intervals and axis otherwise, probable borderline left ventricular hypertrophy          "

## 2022-11-16 NOTE — PATIENT INSTRUCTIONS
Ms Hedy LUGO Severino,  I enjoyed visiting with you again today.  I am glad to hear you are doing well.  Per our conversation I am concerned with the blood pressure, would like to see it lower.  Let us try 50 mg of metoprolol in the evening for about 4 days or so and then go to 50 mg twice a day.  If heart rate is too slow let us know and we will back off on metoprolol back to 25 mg twice a day and then go up on Lotensin to 40 mg a day.  You can call us at [phone].  Given all this I would like to see you in 6 months rather than a year.  Wero Macias

## 2022-11-30 ENCOUNTER — TRANSFERRED RECORDS (OUTPATIENT)
Dept: HEALTH INFORMATION MANAGEMENT | Facility: CLINIC | Age: 62
End: 2022-11-30

## 2022-11-30 ENCOUNTER — LAB REQUISITION (OUTPATIENT)
Dept: LAB | Facility: CLINIC | Age: 62
End: 2022-11-30

## 2022-11-30 DIAGNOSIS — E78.2 MIXED HYPERLIPIDEMIA: ICD-10-CM

## 2022-11-30 DIAGNOSIS — Z12.4 ENCOUNTER FOR SCREENING FOR MALIGNANT NEOPLASM OF CERVIX: ICD-10-CM

## 2022-11-30 DIAGNOSIS — I10 ESSENTIAL (PRIMARY) HYPERTENSION: ICD-10-CM

## 2022-11-30 DIAGNOSIS — E03.9 HYPOTHYROIDISM, UNSPECIFIED: ICD-10-CM

## 2022-11-30 LAB
ALBUMIN SERPL BCG-MCNC: 4.5 G/DL (ref 3.5–5.2)
ALP SERPL-CCNC: 91 U/L (ref 35–104)
ALT SERPL W P-5'-P-CCNC: 24 U/L (ref 10–35)
ANION GAP SERPL CALCULATED.3IONS-SCNC: 12 MMOL/L (ref 7–15)
AST SERPL W P-5'-P-CCNC: 20 U/L (ref 10–35)
BILIRUB SERPL-MCNC: 0.5 MG/DL
BUN SERPL-MCNC: 10.1 MG/DL (ref 8–23)
CALCIUM SERPL-MCNC: 9.3 MG/DL (ref 8.8–10.2)
CHLORIDE SERPL-SCNC: 102 MMOL/L (ref 98–107)
CHOLEST SERPL-MCNC: 224 MG/DL
CREAT SERPL-MCNC: 0.73 MG/DL (ref 0.51–0.95)
DEPRECATED HCO3 PLAS-SCNC: 24 MMOL/L (ref 22–29)
GFR SERPL CREATININE-BSD FRML MDRD: >90 ML/MIN/1.73M2
GLUCOSE SERPL-MCNC: 120 MG/DL (ref 70–99)
HDLC SERPL-MCNC: 63 MG/DL
LDLC SERPL CALC-MCNC: 139 MG/DL
NONHDLC SERPL-MCNC: 161 MG/DL
POTASSIUM SERPL-SCNC: 4 MMOL/L (ref 3.4–5.3)
PROT SERPL-MCNC: 7 G/DL (ref 6.4–8.3)
SODIUM SERPL-SCNC: 138 MMOL/L (ref 136–145)
TRIGL SERPL-MCNC: 111 MG/DL
TSH SERPL DL<=0.005 MIU/L-ACNC: 6.6 UIU/ML (ref 0.3–4.2)

## 2022-11-30 PROCEDURE — 80061 LIPID PANEL: CPT | Performed by: FAMILY MEDICINE

## 2022-11-30 PROCEDURE — 87624 HPV HI-RISK TYP POOLED RSLT: CPT | Performed by: FAMILY MEDICINE

## 2022-11-30 PROCEDURE — 84443 ASSAY THYROID STIM HORMONE: CPT | Performed by: FAMILY MEDICINE

## 2022-11-30 PROCEDURE — G0145 SCR C/V CYTO,THINLAYER,RESCR: HCPCS | Performed by: FAMILY MEDICINE

## 2022-11-30 PROCEDURE — 80053 COMPREHEN METABOLIC PANEL: CPT | Performed by: FAMILY MEDICINE

## 2022-12-02 LAB
BKR LAB AP GYN ADEQUACY: NORMAL
BKR LAB AP GYN INTERPRETATION: NORMAL
BKR LAB AP HPV REFLEX: NORMAL
BKR LAB AP LMP: NORMAL
BKR LAB AP PREVIOUS ABNORMAL: NORMAL
PATH REPORT.COMMENTS IMP SPEC: NORMAL
PATH REPORT.COMMENTS IMP SPEC: NORMAL

## 2022-12-06 LAB
HUMAN PAPILLOMA VIRUS 16 DNA: NEGATIVE
HUMAN PAPILLOMA VIRUS 18 DNA: NEGATIVE
HUMAN PAPILLOMA VIRUS FINAL DIAGNOSIS: NORMAL
HUMAN PAPILLOMA VIRUS OTHER HR: NEGATIVE

## 2022-12-30 DIAGNOSIS — I48.0 PAROXYSMAL ATRIAL FIBRILLATION (H): ICD-10-CM

## 2022-12-30 RX ORDER — FLECAINIDE ACETATE 50 MG/1
TABLET ORAL
Qty: 90 TABLET | Refills: 1 | Status: SHIPPED | OUTPATIENT
Start: 2022-12-30 | End: 2023-06-28

## 2023-03-01 ENCOUNTER — LAB REQUISITION (OUTPATIENT)
Dept: LAB | Facility: CLINIC | Age: 63
End: 2023-03-01

## 2023-03-01 ENCOUNTER — TRANSFERRED RECORDS (OUTPATIENT)
Dept: HEALTH INFORMATION MANAGEMENT | Facility: CLINIC | Age: 63
End: 2023-03-01

## 2023-03-01 DIAGNOSIS — E03.9 HYPOTHYROIDISM, UNSPECIFIED: ICD-10-CM

## 2023-03-01 LAB — TSH SERPL DL<=0.005 MIU/L-ACNC: 2.05 UIU/ML (ref 0.3–4.2)

## 2023-03-01 PROCEDURE — 84443 ASSAY THYROID STIM HORMONE: CPT | Performed by: FAMILY MEDICINE

## 2023-06-05 ENCOUNTER — OFFICE VISIT (OUTPATIENT)
Dept: CARDIOLOGY | Facility: CLINIC | Age: 63
End: 2023-06-05
Payer: COMMERCIAL

## 2023-06-05 VITALS
RESPIRATION RATE: 16 BRPM | BODY MASS INDEX: 40.97 KG/M2 | WEIGHT: 261 LBS | DIASTOLIC BLOOD PRESSURE: 88 MMHG | SYSTOLIC BLOOD PRESSURE: 142 MMHG | HEIGHT: 67 IN | HEART RATE: 48 BPM

## 2023-06-05 DIAGNOSIS — I48.0 PAROXYSMAL ATRIAL FIBRILLATION (H): ICD-10-CM

## 2023-06-05 DIAGNOSIS — E66.01 CLASS 2 SEVERE OBESITY DUE TO EXCESS CALORIES WITH SERIOUS COMORBIDITY IN ADULT, UNSPECIFIED BMI (H): ICD-10-CM

## 2023-06-05 DIAGNOSIS — E66.812 CLASS 2 SEVERE OBESITY DUE TO EXCESS CALORIES WITH SERIOUS COMORBIDITY IN ADULT, UNSPECIFIED BMI (H): ICD-10-CM

## 2023-06-05 DIAGNOSIS — E03.9 ACQUIRED HYPOTHYROIDISM: ICD-10-CM

## 2023-06-05 DIAGNOSIS — R00.1 SINUS BRADYCARDIA: ICD-10-CM

## 2023-06-05 DIAGNOSIS — I10 ESSENTIAL HYPERTENSION, BENIGN: ICD-10-CM

## 2023-06-05 DIAGNOSIS — G47.33 OSA (OBSTRUCTIVE SLEEP APNEA): ICD-10-CM

## 2023-06-05 DIAGNOSIS — I71.20 THORACIC AORTIC ANEURYSM WITHOUT RUPTURE, UNSPECIFIED PART (H): Primary | ICD-10-CM

## 2023-06-05 PROCEDURE — 99214 OFFICE O/P EST MOD 30 MIN: CPT | Performed by: INTERNAL MEDICINE

## 2023-06-05 PROCEDURE — 93000 ELECTROCARDIOGRAM COMPLETE: CPT | Performed by: INTERNAL MEDICINE

## 2023-06-05 RX ORDER — BENAZEPRIL HYDROCHLORIDE 40 MG/1
1 TABLET ORAL
COMMUNITY
Start: 2023-05-30

## 2023-06-05 RX ORDER — CHLORTHALIDONE 25 MG/1
25 TABLET ORAL DAILY
Qty: 10 TABLET | Refills: 1 | Status: SHIPPED | OUTPATIENT
Start: 2023-06-05 | End: 2023-06-14

## 2023-06-05 RX ORDER — LEVOTHYROXINE SODIUM 112 UG/1
TABLET ORAL
COMMUNITY
Start: 2023-03-07

## 2023-06-05 NOTE — PROGRESS NOTES
[facility]  Heart Care Clinic Follow-up Note    Assessment & Plan        (I71.20) Thoracic aortic aneurysm without rupture  (primary encounter diagnosis)  Comment: Based on CAT scan 4.7 x 4.6 which is unchanged from 17 months prior. Surgery reviewed and feel it is more like 5.1 and some mild aortic insufficiency of a trileaflet aortic valve.  They agree with watchful waiting to control blood pressure and should it increase up to 5.5 then surgery at that time.  Repeat CAT scan 4.7.  Stable but blood pressure elevated and will continue to metoprolol 50 mg twice daily, Lotensin 40 mg a day, and change HCTZ over to chlorthalidone 25 mg a day.     (I10) Essential hypertension, benign  Comment: As above, blood pressure significantly elevated, try switching HCTZ over to chlorthalidone.     (I48.0) Paroxysmal atrial fibrillation (H)  Comment:  Truly symptomatic, and persistent and she is in sinus rhythm today. Given this, Eliquis 5 mg p.o. twice daily for  [id number] score 2. No structural heart disease on echo. TSH normal given history of thyroid problem.    Normal nuclear exercise stress test so no issues with flecainide use round-the-clock 25 mg p.o. twice daily and beta-blocker.  Given the aortic root enlargement would like to switch her off of this eventually to sotalol.  If frequent atrial fibrillation consider ablation.     (G47.33) KAREN (obstructive sleep apnea)  Comment: Most likely has sleep apnea, she has deferred sleep study until later this month she is having an intake visit and will have [facility]     (E66.01) Class 2 severe obesity due to excess calories with serious comorbidity in adult, unspecified BMI (H)  Comment: Strongly recommend weight loss.     (E03.9) Acquired hypothyroidism  Comment: Defer to primary but Synthroid has been increased.      (R00.1) Sinus bradycardia  Comment: Most likely secondary beta-blocker and we will not increase it any further,  "did warn her that if she becomes lightheaded or dizzy to let us know.    Plan  1.  Switch HCTZ over to chlorthalidone 25 mg a day.  10-day supply given and if tolerates will get a 90-day supply.  2.  Repeat CT scan around November 2023, if significantly abnormal refer him to surgery.  3.  Follow-up me in 1 year or sooner if needed.  4.  Keep me in the loop on results of sleep study and weight loss.    Subjective  CC: 62-year-old white female here for yearly follow-up today.  She is still living independently with her , they are stressed at home getting ready for graduation party for the grandson.  She states in the most part she does have shortness of breath going up a flight of steps or on hot humid days but no PND, orthopnea, chest pains, major palpitations, syncope, dizziness or peripheral edema.    Medications  Current Outpatient Medications   Medication Sig Dispense Refill     apixaban ANTICOAGULANT (ELIQUIS) 5 mg Tab tablet [APIXABAN ANTICOAGULANT (ELIQUIS) 5 MG TAB TABLET] Take 5 mg by mouth 2 (two) times a day.       benazepril (LOTENSIN) 40 MG tablet Take 1 tablet by mouth daily at 2 pm       chlorthalidone (HYGROTON) 25 MG tablet Take 1 tablet (25 mg) by mouth daily 10 tablet 1     flecainide (TAMBOCOR) 50 MG tablet TAKE 1/2 TABLET(25 MG) BY MOUTH TWICE DAILY 90 tablet 1     hydroCHLOROthiazide (HYDRODIURIL) 12.5 MG tablet [HYDROCHLOROTHIAZIDE (HYDRODIURIL) 12.5 MG TABLET] Take 12.5 mg by mouth daily.       levothyroxine (SYNTHROID/LEVOTHROID) 112 MCG tablet TAKE 2 TABLETS BY MOUTH EVERY DAY FOR THYROID       metoprolol tartrate (LOPRESSOR) 25 MG tablet TAKE 1 TABLET(25 MG) BY MOUTH TWICE DAILY (Patient taking differently: 50 mg 2 times daily) 180 tablet 2       Objective  BP (!) 142/88 (BP Location: Right arm, Patient Position: Sitting, Cuff Size: Adult Large)   Pulse (!) 48   Resp 16   Ht 1.702 m (5' 7\")   Wt 118.4 kg (261 lb)   BMI 40.88 kg/m      General Appearance:    Alert, cooperative, " no distress, appears stated age   Head:    Normocephalic, without obvious abnormality, atraumatic   Throat:   Lips, mucosa, and tongue normal; teeth and gums normal   Neck:   Supple, symmetrical, trachea midline, no adenopathy;        thyroid:  No enlargement/tenderness/nodules; no carotid    bruit or JVD   Back:     Symmetric, no curvature, ROM normal, no CVA tenderness   Lungs:     Clear to auscultation bilaterally, respirations unlabored   Chest wall:    No tenderness or deformity   Heart:    Regular rate and rhythm, S1 and S2 normal, no murmur, rub   or gallop   Abdomen:     Soft, non-tender, bowel sounds active all four quadrants,     no masses, no organomegaly   Extremities:   Normal, atraumatic, no cyanosis or edema   Pulses:   2+ and symmetric all extremities   Skin:   Skin color, texture, turgor normal, no rashes or lesions     Results    Lab Results personally reviewed   Lab Results   Component Value Date    CHOL 224 (H) 11/30/2022    CHOL 238 (H) 11/26/2021     Lab Results   Component Value Date    HDL 63 11/30/2022    HDL 70 11/26/2021     No components found for: LDLCALC  Lab Results   Component Value Date    TRIG 111 11/30/2022    TRIG 138 11/26/2021     Lab Results   Component Value Date    WBC 7.6 03/18/2022    HGB 13.6 03/18/2022    HCT 41.9 03/18/2022     03/18/2022     Lab Results   Component Value Date    BUN 10.1 11/30/2022     11/30/2022    CO2 24 11/30/2022       Reviewed electrocardiogram sinus bradycardia, occasional PAC, no acute changes

## 2023-06-05 NOTE — LETTER
6/5/2023    Arun Good MD  [address]  [place] [address]    RE: Hedy Haq       Dear Colleague,     I had the pleasure of seeing Hedy Haq in the [facility].      [facility]  Heart Care Clinic Follow-up Note    Assessment & Plan        (I71.20) Thoracic aortic aneurysm without rupture  (primary encounter diagnosis)  Comment: Based on CAT scan 4.7 x 4.6 which is unchanged from 17 months prior. Surgery reviewed and feel it is more like 5.1 and some mild aortic insufficiency of a trileaflet aortic valve.  They agree with watchful waiting to control blood pressure and should it increase up to 5.5 then surgery at that time.  Repeat CAT scan 4.7.  Stable but blood pressure elevated and will continue to metoprolol 50 mg twice daily, Lotensin 40 mg a day, and change HCTZ over to chlorthalidone 25 mg a day.     (I10) Essential hypertension, benign  Comment: As above, blood pressure significantly elevated, try switching HCTZ over to chlorthalidone.     (I48.0) Paroxysmal atrial fibrillation (H)  Comment:  Truly symptomatic, and persistent and she is in sinus rhythm today. Given this, Eliquis 5 mg p.o. twice daily for  [id number] score 2. No structural heart disease on echo. TSH normal given history of thyroid problem.    Normal nuclear exercise stress test so no issues with flecainide use round-the-clock 25 mg p.o. twice daily and beta-blocker.  Given the aortic root enlargement would like to switch her off of this eventually to sotalol.  If frequent atrial fibrillation consider ablation.     (G47.33) KAREN (obstructive sleep apnea)  Comment: Most likely has sleep apnea, she has deferred sleep study until later this month she is having an intake visit and will [facility].     (E66.01) Class 2 severe obesity due to excess calories with serious comorbidity in adult, unspecified BMI (H)  Comment: Strongly recommend  weight loss.     (E03.9) Acquired hypothyroidism  Comment: Defer to primary but Synthroid has been increased.      (R00.1) Sinus bradycardia  Comment: Most likely secondary beta-blocker and we will not increase it any further, did warn her that if she becomes lightheaded or dizzy to let us know.    Plan  1.  Switch HCTZ over to chlorthalidone 25 mg a day.  10-day supply given and if tolerates will get a 90-day supply.  2.  Repeat CT scan around November 2023, if significantly abnormal refer him to surgery.  3.  Follow-up me in 1 year or sooner if needed.  4.  Keep me in the loop on results of sleep study and weight loss.    Subjective  CC: 62-year-old white female here for yearly follow-up today.  She is still living independently with her , they are stressed at home getting ready for graduation party for the grandson.  She states in the most part she does have shortness of breath going up a flight of steps or on hot humid days but no PND, orthopnea, chest pains, major palpitations, syncope, dizziness or peripheral edema.    Medications  Current Outpatient Medications   Medication Sig Dispense Refill    apixaban ANTICOAGULANT (ELIQUIS) 5 mg Tab tablet [APIXABAN ANTICOAGULANT (ELIQUIS) 5 MG TAB TABLET] Take 5 mg by mouth 2 (two) times a day.      benazepril (LOTENSIN) 40 MG tablet Take 1 tablet by mouth daily at 2 pm      chlorthalidone (HYGROTON) 25 MG tablet Take 1 tablet (25 mg) by mouth daily 10 tablet 1    flecainide (TAMBOCOR) 50 MG tablet TAKE 1/2 TABLET(25 MG) BY MOUTH TWICE DAILY 90 tablet 1    hydroCHLOROthiazide (HYDRODIURIL) 12.5 MG tablet [HYDROCHLOROTHIAZIDE (HYDRODIURIL) 12.5 MG TABLET] Take 12.5 mg by mouth daily.      levothyroxine (SYNTHROID/LEVOTHROID) 112 MCG tablet TAKE 2 TABLETS BY MOUTH EVERY DAY FOR THYROID      metoprolol tartrate (LOPRESSOR) 25 MG tablet TAKE 1 TABLET(25 MG) BY MOUTH TWICE DAILY (Patient taking differently: 50 mg 2 times daily) 180 tablet 2       Objective  BP (!)  "142/88 (BP Location: Right arm, Patient Position: Sitting, Cuff Size: Adult Large)   Pulse (!) 48   Resp 16   Ht 1.702 m (5' 7\")   Wt 118.4 kg (261 lb)   BMI 40.88 kg/m      General Appearance:    Alert, cooperative, no distress, appears stated age   Head:    Normocephalic, without obvious abnormality, atraumatic   Throat:   Lips, mucosa, and tongue normal; teeth and gums normal   Neck:   Supple, symmetrical, trachea midline, no adenopathy;        thyroid:  No enlargement/tenderness/nodules; no carotid    bruit or JVD   Back:     Symmetric, no curvature, ROM normal, no CVA tenderness   Lungs:     Clear to auscultation bilaterally, respirations unlabored   Chest wall:    No tenderness or deformity   Heart:    Regular rate and rhythm, S1 and S2 normal, no murmur, rub   or gallop   Abdomen:     Soft, non-tender, bowel sounds active all four quadrants,     no masses, no organomegaly   Extremities:   Normal, atraumatic, no cyanosis or edema   Pulses:   2+ and symmetric all extremities   Skin:   Skin color, texture, turgor normal, no rashes or lesions     Results    Lab Results personally reviewed   Lab Results   Component Value Date    CHOL 224 (H) 11/30/2022    CHOL 238 (H) 11/26/2021     Lab Results   Component Value Date    HDL 63 11/30/2022    HDL 70 11/26/2021     No components found for: LDLCALC  Lab Results   Component Value Date    TRIG 111 11/30/2022    TRIG 138 11/26/2021     Lab Results   Component Value Date    WBC 7.6 03/18/2022    HGB 13.6 03/18/2022    HCT 41.9 03/18/2022     03/18/2022     Lab Results   Component Value Date    BUN 10.1 11/30/2022     11/30/2022    CO2 24 11/30/2022       Reviewed electrocardiogram sinus bradycardia, occasional PAC, no acute changes              Thank you for allowing me to participate in the care of your patient.      Sincerely,     NORMAN MACIAS MD     [facility]  cc:   Norman Macias MD  [address] [facility], [address]  [place] [address]

## 2023-06-05 NOTE — PATIENT INSTRUCTIONS
Ms Hedy LUGO Severino,  I enjoyed visiting with you again today.  I am glad to hear you are doing well.  Per our conversation let us switch the hydrochlorothiazide to chlorthaidone and try a 10 day trial and if no issues call me at [phone].  We will recheck scan around November.  I will plan on seeing you 1 year.  Wero Macias

## 2023-06-06 LAB
ATRIAL RATE - MUSE: 46 BPM
DIASTOLIC BLOOD PRESSURE - MUSE: NORMAL MMHG
INTERPRETATION ECG - MUSE: NORMAL
P AXIS - MUSE: 48 DEGREES
PR INTERVAL - MUSE: 208 MS
QRS DURATION - MUSE: 82 MS
QT - MUSE: 484 MS
QTC - MUSE: 423 MS
R AXIS - MUSE: -7 DEGREES
SYSTOLIC BLOOD PRESSURE - MUSE: NORMAL MMHG
T AXIS - MUSE: 54 DEGREES
VENTRICULAR RATE- MUSE: 46 BPM

## 2023-06-14 ENCOUNTER — MYC MEDICAL ADVICE (OUTPATIENT)
Dept: CARDIOLOGY | Facility: CLINIC | Age: 63
End: 2023-06-14
Payer: COMMERCIAL

## 2023-06-14 DIAGNOSIS — I10 ESSENTIAL HYPERTENSION, BENIGN: ICD-10-CM

## 2023-06-14 RX ORDER — CHLORTHALIDONE 25 MG/1
25 TABLET ORAL DAILY
Qty: 30 TABLET | Refills: 0 | Status: SHIPPED | OUTPATIENT
Start: 2023-06-14 | End: 2023-06-16

## 2023-06-16 RX ORDER — CHLORTHALIDONE 25 MG/1
25 TABLET ORAL DAILY
Qty: 90 TABLET | Refills: 0 | Status: SHIPPED | OUTPATIENT
Start: 2023-06-16 | End: 2023-07-18

## 2023-06-16 NOTE — TELEPHONE ENCOUNTER
Wero Macias MD Caswell, Mallory J, RN  Phone Number: [phone]     Okay with chlorthalidone but pulse a little low,   Confirm metoprolol 50 bid and keep tabs on dizzy, and if get worse or more frequent will need to lower dose to 25 bid.   LF

## 2023-06-27 DIAGNOSIS — I48.0 PAROXYSMAL ATRIAL FIBRILLATION (H): ICD-10-CM

## 2023-06-28 RX ORDER — FLECAINIDE ACETATE 50 MG/1
TABLET ORAL
Qty: 90 TABLET | Refills: 3 | Status: SHIPPED | OUTPATIENT
Start: 2023-06-28 | End: 2024-06-28

## 2023-07-17 DIAGNOSIS — I10 ESSENTIAL HYPERTENSION, BENIGN: ICD-10-CM

## 2023-07-18 RX ORDER — CHLORTHALIDONE 25 MG/1
TABLET ORAL
Qty: 90 TABLET | Refills: 0 | Status: SHIPPED | OUTPATIENT
Start: 2023-07-18 | End: 2024-01-16

## 2023-10-15 ENCOUNTER — HEALTH MAINTENANCE LETTER (OUTPATIENT)
Age: 63
End: 2023-10-15

## 2023-11-13 ENCOUNTER — HOSPITAL ENCOUNTER (OUTPATIENT)
Dept: CT IMAGING | Facility: HOSPITAL | Age: 63
Discharge: HOME | End: 2023-11-13
Attending: INTERNAL MEDICINE | Admitting: INTERNAL MEDICINE
Payer: COMMERCIAL

## 2023-11-13 DIAGNOSIS — I71.20 THORACIC AORTIC ANEURYSM WITHOUT RUPTURE, UNSPECIFIED PART (H): ICD-10-CM

## 2023-11-13 PROCEDURE — 250N000011 HC RX IP 250 OP 636: Performed by: INTERNAL MEDICINE

## 2023-11-13 PROCEDURE — 71260 CT THORAX DX C+: CPT

## 2023-11-13 RX ORDER — IOPAMIDOL 755 MG/ML
90 INJECTION, SOLUTION INTRAVASCULAR ONCE
Status: COMPLETED | OUTPATIENT
Start: 2023-11-13 | End: 2023-11-13

## 2023-11-13 RX ADMIN — IOPAMIDOL 90 ML: 755 INJECTION, SOLUTION INTRAVENOUS at 08:39

## 2023-12-01 ENCOUNTER — TRANSFERRED RECORDS (OUTPATIENT)
Dept: HEALTH INFORMATION MANAGEMENT | Facility: CLINIC | Age: 63
End: 2023-12-01

## 2023-12-01 ENCOUNTER — LAB REQUISITION (OUTPATIENT)
Dept: LAB | Facility: CLINIC | Age: 63
End: 2023-12-01

## 2023-12-01 DIAGNOSIS — E78.2 MIXED HYPERLIPIDEMIA: ICD-10-CM

## 2023-12-01 DIAGNOSIS — I10 ESSENTIAL (PRIMARY) HYPERTENSION: ICD-10-CM

## 2023-12-01 DIAGNOSIS — E03.9 HYPOTHYROIDISM, UNSPECIFIED: ICD-10-CM

## 2023-12-01 LAB
ALBUMIN SERPL BCG-MCNC: 4.2 G/DL (ref 3.5–5.2)
ALP SERPL-CCNC: 103 U/L (ref 40–150)
ALT SERPL W P-5'-P-CCNC: 19 U/L (ref 0–50)
ANION GAP SERPL CALCULATED.3IONS-SCNC: 12 MMOL/L (ref 7–15)
AST SERPL W P-5'-P-CCNC: 16 U/L (ref 0–45)
BASOPHILS # BLD AUTO: 0.1 10E3/UL (ref 0–0.2)
BASOPHILS NFR BLD AUTO: 1 %
BILIRUB SERPL-MCNC: 0.4 MG/DL
BUN SERPL-MCNC: 13 MG/DL (ref 8–23)
CALCIUM SERPL-MCNC: 9.7 MG/DL (ref 8.8–10.2)
CHLORIDE SERPL-SCNC: 106 MMOL/L (ref 98–107)
CHOLEST SERPL-MCNC: 201 MG/DL
CREAT SERPL-MCNC: 0.77 MG/DL (ref 0.51–0.95)
DEPRECATED HCO3 PLAS-SCNC: 27 MMOL/L (ref 22–29)
EGFRCR SERPLBLD CKD-EPI 2021: 85 ML/MIN/1.73M2
EOSINOPHIL # BLD AUTO: 0.3 10E3/UL (ref 0–0.7)
EOSINOPHIL NFR BLD AUTO: 3 %
ERYTHROCYTE [DISTWIDTH] IN BLOOD BY AUTOMATED COUNT: 15.5 % (ref 10–15)
GLUCOSE SERPL-MCNC: 101 MG/DL (ref 70–99)
HCT VFR BLD AUTO: 39.3 % (ref 35–47)
HDLC SERPL-MCNC: 66 MG/DL
HGB BLD-MCNC: 12.2 G/DL (ref 11.7–15.7)
IMM GRANULOCYTES # BLD: 0 10E3/UL
IMM GRANULOCYTES NFR BLD: 0 %
LDLC SERPL CALC-MCNC: 113 MG/DL
LYMPHOCYTES # BLD AUTO: 2.1 10E3/UL (ref 0.8–5.3)
LYMPHOCYTES NFR BLD AUTO: 20 %
MCH RBC QN AUTO: 26.2 PG (ref 26.5–33)
MCHC RBC AUTO-ENTMCNC: 31 G/DL (ref 31.5–36.5)
MCV RBC AUTO: 84 FL (ref 78–100)
MONOCYTES # BLD AUTO: 0.8 10E3/UL (ref 0–1.3)
MONOCYTES NFR BLD AUTO: 8 %
NEUTROPHILS # BLD AUTO: 7 10E3/UL (ref 1.6–8.3)
NEUTROPHILS NFR BLD AUTO: 68 %
NONHDLC SERPL-MCNC: 135 MG/DL
NRBC # BLD AUTO: 0 10E3/UL
NRBC BLD AUTO-RTO: 0 /100
PLATELET # BLD AUTO: 334 10E3/UL (ref 150–450)
POTASSIUM SERPL-SCNC: 3.9 MMOL/L (ref 3.4–5.3)
PROT SERPL-MCNC: 7.2 G/DL (ref 6.4–8.3)
RBC # BLD AUTO: 4.66 10E6/UL (ref 3.8–5.2)
SODIUM SERPL-SCNC: 145 MMOL/L (ref 135–145)
TRIGL SERPL-MCNC: 108 MG/DL
WBC # BLD AUTO: 10.3 10E3/UL (ref 4–11)

## 2023-12-01 PROCEDURE — 85025 COMPLETE CBC W/AUTO DIFF WBC: CPT | Performed by: FAMILY MEDICINE

## 2023-12-01 PROCEDURE — 80053 COMPREHEN METABOLIC PANEL: CPT | Performed by: FAMILY MEDICINE

## 2023-12-01 PROCEDURE — 80061 LIPID PANEL: CPT | Performed by: FAMILY MEDICINE

## 2023-12-01 PROCEDURE — 84443 ASSAY THYROID STIM HORMONE: CPT | Performed by: FAMILY MEDICINE

## 2023-12-02 LAB — TSH SERPL DL<=0.005 MIU/L-ACNC: 0.61 UIU/ML (ref 0.3–4.2)

## 2023-12-24 ENCOUNTER — HEALTH MAINTENANCE LETTER (OUTPATIENT)
Age: 63
End: 2023-12-24

## 2024-01-16 DIAGNOSIS — I10 ESSENTIAL HYPERTENSION, BENIGN: ICD-10-CM

## 2024-01-16 RX ORDER — CHLORTHALIDONE 25 MG/1
TABLET ORAL
Qty: 90 TABLET | Refills: 0 | Status: SHIPPED | OUTPATIENT
Start: 2024-01-16 | End: 2024-04-16

## 2024-04-16 DIAGNOSIS — I10 ESSENTIAL HYPERTENSION, BENIGN: ICD-10-CM

## 2024-04-16 RX ORDER — CHLORTHALIDONE 25 MG/1
TABLET ORAL
Qty: 90 TABLET | Refills: 0 | Status: SHIPPED | OUTPATIENT
Start: 2024-04-16 | End: 2024-07-15

## 2024-05-17 NOTE — DISCHARGE INSTRUCTIONS
Keep the arm elevated in the sling, but remove the sling at nighttime to sleep as it can be a choking hazard.    Do apply the ice pack for about an hour at a time over the splint just keep the splint dry.    Return to the emergency department as needed for any developing signs of a worsened head injury that appears similar to a concussion.  Make sure your family members whom you will be with read the information sheet that was given to you on discharge discussing head injuries and reasons to return to the emergency department because since you are on the Eliquis you can have very slow delayed bleeding anytime in the next couple of weeks.   myself myself

## 2024-06-03 ENCOUNTER — OFFICE VISIT (OUTPATIENT)
Dept: CARDIOLOGY | Facility: CLINIC | Age: 64
End: 2024-06-03
Payer: COMMERCIAL

## 2024-06-03 VITALS
RESPIRATION RATE: 14 BRPM | BODY MASS INDEX: 41.04 KG/M2 | HEART RATE: 52 BPM | DIASTOLIC BLOOD PRESSURE: 88 MMHG | SYSTOLIC BLOOD PRESSURE: 138 MMHG | WEIGHT: 262 LBS

## 2024-06-03 DIAGNOSIS — I48.0 PAROXYSMAL ATRIAL FIBRILLATION (H): ICD-10-CM

## 2024-06-03 DIAGNOSIS — I71.20 THORACIC AORTIC ANEURYSM WITHOUT RUPTURE, UNSPECIFIED PART (H): Primary | ICD-10-CM

## 2024-06-03 DIAGNOSIS — R00.1 SINUS BRADYCARDIA: ICD-10-CM

## 2024-06-03 DIAGNOSIS — G47.33 OSA (OBSTRUCTIVE SLEEP APNEA): ICD-10-CM

## 2024-06-03 DIAGNOSIS — E66.812 CLASS 2 SEVERE OBESITY DUE TO EXCESS CALORIES WITH SERIOUS COMORBIDITY AND BODY MASS INDEX (BMI) OF 39.0 TO 39.9 IN ADULT (H): ICD-10-CM

## 2024-06-03 DIAGNOSIS — E66.01 CLASS 2 SEVERE OBESITY DUE TO EXCESS CALORIES WITH SERIOUS COMORBIDITY AND BODY MASS INDEX (BMI) OF 39.0 TO 39.9 IN ADULT (H): ICD-10-CM

## 2024-06-03 DIAGNOSIS — E03.9 ACQUIRED HYPOTHYROIDISM: ICD-10-CM

## 2024-06-03 DIAGNOSIS — I10 ESSENTIAL HYPERTENSION, BENIGN: ICD-10-CM

## 2024-06-03 PROCEDURE — 93000 ELECTROCARDIOGRAM COMPLETE: CPT | Performed by: INTERNAL MEDICINE

## 2024-06-03 PROCEDURE — 99214 OFFICE O/P EST MOD 30 MIN: CPT | Performed by: INTERNAL MEDICINE

## 2024-06-03 RX ORDER — METOPROLOL TARTRATE 50 MG
1 TABLET ORAL
COMMUNITY
Start: 2024-04-01

## 2024-06-03 NOTE — PROGRESS NOTES
[facility]  Heart Care Clinic Follow-up Note    Assessment & Plan          (I71.20) Thoracic aortic aneurysm without rupture  (primary encounter diagnosis)  Comment: Based on CAT scan 4.7 x 5.2 which is slightly increased from 2020 where it measured 4.7 x 4.7.  Surgery reviewed and feel it is more like 5.1 and some mild aortic insufficiency of a trileaflet aortic valve.  They agree with watchful waiting to control blood pressure and should it increase up to 5.5 then surgery at that time.  Repeat CAT scan November 2024.  Blood pressure under excellent control on metoprolol, benazepril, chlorthalidone.     (I10) Essential hypertension, benign  Comment: As above, blood pressure under good control currently.    (I48.0) Paroxysmal atrial fibrillation (H)  Comment:  Truly symptomatic, and persistent and she is in sinus rhythm today. Given this, Eliquis 5 mg p.o. twice daily for  [id number] score 2. No structural heart disease on echo. TSH normal given history of thyroid problem.    Normal nuclear exercise stress test so no issues with flecainide use round-the-clock 25 mg p.o. twice daily and beta-blocker.  Given the aortic root enlargement would like to switch her off of this eventually to and considering that she appears to be somewhat symptomatic we will refer on to EP to discuss ablation.     (G47.33) KAREN (obstructive sleep apnea)  Comment: Since I seen her she is not using CPAP.    (E66.01) Class 2 severe obesity due to excess calories with serious comorbidity in adult, unspecified BMI (H)  Comment: Strongly recommend weight loss.  Looks to be BMI now 41 so could possibly be considered morbid obesity.     (E03.9) Acquired hypothyroidism  Comment: Defer to primary but on Synthroid.    (R00.1) Sinus bradycardia  Comment: Most likely secondary beta-blocker and we lower dose of this and she has done well, but considering the sinus bradycardia will refer to EP to possibly get her off flecainide as well as  metoprolol.  I am concerned given her spells that this could be due to bradycardia and would like to back off on the beta-blocker and thus refer to EP as above.    Plan  1.  Arrange for repeat chest CT around October November 2024.  2.  Follow-up with me in about 15 months.  3.  Continue to work on weight loss.  4.  Refer to EP to discuss ablation and potential discontinuation of flecainide and metoprolol.  5.  Consider stress testing given her chest discomfort.    Subjective  CC: 63-year-old white female here for yearly follow-up.  Still living independently with her , still doing her own gardening.  Tells me she gets maybe once a week heart racing for about 5 minutes but she is fairly confident it is atrial fibrillation.  Tells me maybe once a week for about 5 minutes she gets a tightness in her chest with associated shortness of breath that comes on at rest and goes away on its own.  She has had 2 episodes over the last 6 months where she all of a sudden feels weak and unsteady on her feet and needs to sit down.  This resolves within about 5 to 10 minutes.  Denies any PND, orthopnea, peripheral edema, syncope or dizziness.  Since I seen her she is now utilizing CPAP.    Medications  Current Outpatient Medications   Medication Sig Dispense Refill    apixaban ANTICOAGULANT (ELIQUIS) 5 mg Tab tablet [APIXABAN ANTICOAGULANT (ELIQUIS) 5 MG TAB TABLET] Take 5 mg by mouth 2 (two) times a day.      benazepril (LOTENSIN) 40 MG tablet Take 1 tablet by mouth daily at 2 pm      chlorthalidone (HYGROTON) 25 MG tablet TAKE 1 TABLET(25 MG) BY MOUTH DAILY 90 tablet 0    flecainide (TAMBOCOR) 50 MG tablet TAKE 1/2 TABLET(25 MG) BY MOUTH TWICE DAILY 90 tablet 3    levothyroxine (SYNTHROID/LEVOTHROID) 112 MCG tablet TAKE 2 TABLETS BY MOUTH EVERY DAY FOR THYROID      metoprolol tartrate (LOPRESSOR) 50 MG tablet Take 1 tablet by mouth 2 times daily Take 50 mg in the morning and 25 mg in the evening         Objective  /88  "(BP Location: Left arm, Patient Position: Sitting, Cuff Size: Adult Large)   Pulse 52   Resp 14   Wt 118.8 kg (262 lb)   BMI 41.04 kg/m      General Appearance:    Alert, cooperative, no distress, appears stated age   Head:    Normocephalic, without obvious abnormality, atraumatic   Throat:   Lips, mucosa, and tongue normal; teeth and gums normal   Neck:   Supple, symmetrical, trachea midline, no adenopathy;        thyroid:  No enlargement/tenderness/nodules; no carotid    bruit or JVD   Back:     Symmetric, no curvature, ROM normal, no CVA tenderness   Lungs:     Clear to auscultation bilaterally, respirations unlabored   Chest wall:    No tenderness or deformity   Heart:    Regular rate and rhythm, S1 and S2 normal, no murmur, rub   or gallop   Abdomen:     Soft, non-tender, bowel sounds active all four quadrants,     no masses, no organomegaly   Extremities:   Normal, atraumatic, no cyanosis or edema   Pulses:   2+ and symmetric all extremities   Skin:   Skin color, texture, turgor normal, no rashes or lesions     Results    Lab Results personally reviewed   Lab Results   Component Value Date    CHOL 201 (H) 12/01/2023    CHOL 224 (H) 11/30/2022     Lab Results   Component Value Date    HDL 66 12/01/2023    HDL 63 11/30/2022     No components found for: \"LDLCALC\"  Lab Results   Component Value Date    TRIG 108 12/01/2023    TRIG 111 11/30/2022     Lab Results   Component Value Date    WBC 10.3 12/01/2023    HGB 12.2 12/01/2023    HCT 39.3 12/01/2023     12/01/2023     Lab Results   Component Value Date    BUN 13.0 12/01/2023     12/01/2023    CO2 27 12/01/2023       Reviewed electrocardiogram and sinus bradycardia, sinus arrhythmia, leftward axis, normal intervals.          "

## 2024-06-03 NOTE — LETTER
6/3/2024    Arun Good MD  [address]  [place] [address]    RE: Hedy Haq       Dear Colleague,     I had the pleasure of seeing Hedy Haq in the [facility].      [facility]  Heart Care Clinic Follow-up Note    Assessment & Plan          (I71.20) Thoracic aortic aneurysm without rupture  (primary encounter diagnosis)  Comment: Based on CAT scan 4.7 x 5.2 which is slightly increased from 2020 where it measured 4.7 x 4.7.  Surgery reviewed and feel it is more like 5.1 and some mild aortic insufficiency of a trileaflet aortic valve.  They agree with watchful waiting to control blood pressure and should it increase up to 5.5 then surgery at that time.  Repeat CAT scan November 2024.  Blood pressure under excellent control on metoprolol, benazepril, chlorthalidone.     (I10) Essential hypertension, benign  Comment: As above, blood pressure under good control currently.    (I48.0) Paroxysmal atrial fibrillation (H)  Comment:  Truly symptomatic, and persistent and she is in sinus rhythm today. Given this, Eliquis 5 mg p.o. twice daily for  [id number] score 2. No structural heart disease on echo. TSH normal given history of thyroid problem.    Normal nuclear exercise stress test so no issues with flecainide use round-the-clock 25 mg p.o. twice daily and beta-blocker.  Given the aortic root enlargement would like to switch her off of this eventually to and considering that she appears to be somewhat symptomatic we will refer on to EP to discuss ablation.     (G47.33) KAREN (obstructive sleep apnea)  Comment: Since I seen her she is not using CPAP.    (E66.01) Class 2 severe obesity due to excess calories with serious comorbidity in adult, unspecified BMI (H)  Comment: Strongly recommend weight loss.  Looks to be BMI now 41 so could possibly be considered morbid obesity.     (E03.9) Acquired hypothyroidism  Comment: Defer to primary but on  Synthroid.    (R00.1) Sinus bradycardia  Comment: Most likely secondary beta-blocker and we lower dose of this and she has done well, but considering the sinus bradycardia will refer to EP to possibly get her off flecainide as well as metoprolol.  I am concerned given her spells that this could be due to bradycardia and would like to back off on the beta-blocker and thus refer to EP as above.    Plan  1.  Arrange for repeat chest CT around October November 2024.  2.  Follow-up with me in about 15 months.  3.  Continue to work on weight loss.  4.  Refer to EP to discuss ablation and potential discontinuation of flecainide and metoprolol.  5.  Consider stress testing given her chest discomfort.    Subjective  CC: 63-year-old white female here for yearly follow-up.  Still living independently with her , still doing her own gardening.  Tells me she gets maybe once a week heart racing for about 5 minutes but she is fairly confident it is atrial fibrillation.  Tells me maybe once a week for about 5 minutes she gets a tightness in her chest with associated shortness of breath that comes on at rest and goes away on its own.  She has had 2 episodes over the last 6 months where she all of a sudden feels weak and unsteady on her feet and needs to sit down.  This resolves within about 5 to 10 minutes.  Denies any PND, orthopnea, peripheral edema, syncope or dizziness.  Since I seen her she is now utilizing CPAP.    Medications  Current Outpatient Medications   Medication Sig Dispense Refill    apixaban ANTICOAGULANT (ELIQUIS) 5 mg Tab tablet [APIXABAN ANTICOAGULANT (ELIQUIS) 5 MG TAB TABLET] Take 5 mg by mouth 2 (two) times a day.      benazepril (LOTENSIN) 40 MG tablet Take 1 tablet by mouth daily at 2 pm      chlorthalidone (HYGROTON) 25 MG tablet TAKE 1 TABLET(25 MG) BY MOUTH DAILY 90 tablet 0    flecainide (TAMBOCOR) 50 MG tablet TAKE 1/2 TABLET(25 MG) BY MOUTH TWICE DAILY 90 tablet 3    levothyroxine  "(SYNTHROID/LEVOTHROID) 112 MCG tablet TAKE 2 TABLETS BY MOUTH EVERY DAY FOR THYROID      metoprolol tartrate (LOPRESSOR) 50 MG tablet Take 1 tablet by mouth 2 times daily Take 50 mg in the morning and 25 mg in the evening         Objective  /88 (BP Location: Left arm, Patient Position: Sitting, Cuff Size: Adult Large)   Pulse 52   Resp 14   Wt 118.8 kg (262 lb)   BMI 41.04 kg/m      General Appearance:    Alert, cooperative, no distress, appears stated age   Head:    Normocephalic, without obvious abnormality, atraumatic   Throat:   Lips, mucosa, and tongue normal; teeth and gums normal   Neck:   Supple, symmetrical, trachea midline, no adenopathy;        thyroid:  No enlargement/tenderness/nodules; no carotid    bruit or JVD   Back:     Symmetric, no curvature, ROM normal, no CVA tenderness   Lungs:     Clear to auscultation bilaterally, respirations unlabored   Chest wall:    No tenderness or deformity   Heart:    Regular rate and rhythm, S1 and S2 normal, no murmur, rub   or gallop   Abdomen:     Soft, non-tender, bowel sounds active all four quadrants,     no masses, no organomegaly   Extremities:   Normal, atraumatic, no cyanosis or edema   Pulses:   2+ and symmetric all extremities   Skin:   Skin color, texture, turgor normal, no rashes or lesions     Results    Lab Results personally reviewed   Lab Results   Component Value Date    CHOL 201 (H) 12/01/2023    CHOL 224 (H) 11/30/2022     Lab Results   Component Value Date    HDL 66 12/01/2023    HDL 63 11/30/2022     No components found for: \"LDLCALC\"  Lab Results   Component Value Date    TRIG 108 12/01/2023    TRIG 111 11/30/2022     Lab Results   Component Value Date    WBC 10.3 12/01/2023    HGB 12.2 12/01/2023    HCT 39.3 12/01/2023     12/01/2023     Lab Results   Component Value Date    BUN 13.0 12/01/2023     12/01/2023    CO2 27 12/01/2023       Reviewed electrocardiogram and sinus bradycardia, sinus arrhythmia, leftward axis, " normal intervals.              Thank you for allowing me to participate in the care of your patient.      Sincerely,     NORMAN MACIAS MD     [facility]  cc:   Alisia Macias MD  [address], [address]  [place] [address]

## 2024-06-03 NOTE — PATIENT INSTRUCTIONS
Ms Knott PARISH Haq,  I enjoyed visiting with you again today.  I am glad to hear you are doing well.  Per our conversation continue to work on weight loss.  Let us arrange for the CAT scan around October or November of this year.  I will plan on you seeing the electricians to discuss possible ablation.  I will plan on seeing you a little over a year.  Wero Macias

## 2024-06-04 LAB
ATRIAL RATE - MUSE: 51 BPM
DIASTOLIC BLOOD PRESSURE - MUSE: NORMAL MMHG
INTERPRETATION ECG - MUSE: NORMAL
P AXIS - MUSE: 80 DEGREES
PR INTERVAL - MUSE: 230 MS
QRS DURATION - MUSE: 92 MS
QT - MUSE: 450 MS
QTC - MUSE: 414 MS
R AXIS - MUSE: -11 DEGREES
SYSTOLIC BLOOD PRESSURE - MUSE: NORMAL MMHG
T AXIS - MUSE: 58 DEGREES
VENTRICULAR RATE- MUSE: 51 BPM

## 2024-06-26 ENCOUNTER — OFFICE VISIT (OUTPATIENT)
Dept: CARDIOLOGY | Facility: CLINIC | Age: 64
End: 2024-06-26
Attending: INTERNAL MEDICINE
Payer: COMMERCIAL

## 2024-06-26 ENCOUNTER — TELEPHONE (OUTPATIENT)
Dept: CARDIOLOGY | Facility: CLINIC | Age: 64
End: 2024-06-26

## 2024-06-26 VITALS
WEIGHT: 261.5 LBS | OXYGEN SATURATION: 96 % | HEART RATE: 46 BPM | DIASTOLIC BLOOD PRESSURE: 72 MMHG | SYSTOLIC BLOOD PRESSURE: 132 MMHG | RESPIRATION RATE: 16 BRPM | BODY MASS INDEX: 40.96 KG/M2

## 2024-06-26 DIAGNOSIS — I48.0 PAROXYSMAL ATRIAL FIBRILLATION (H): Primary | ICD-10-CM

## 2024-06-26 PROCEDURE — 99204 OFFICE O/P NEW MOD 45 MIN: CPT | Performed by: INTERNAL MEDICINE

## 2024-06-26 NOTE — TELEPHONE ENCOUNTER
Noted.  PC notes created and postponed to 1 wk to contact pt, regarding decision.  6/26/2024 10:54 AM  KRISTYN Jose, Serena Chandra MD  [facility] Support [facility],    Can we please call Mrs. Haq in ~1wk to review AF management options?    Thank you,  Corazon    6/26/24:  [id number]/Paroxysmal atrial fibrillation - symptomatic with palpitations  [id number] 3  We reviewed atrial fibrillation physiology, managing stroke risk, cardioversion, antiarrhythmic drug therapy, and catheter ablation.  We discussed atrial fibrillation ablation procedures, anticipated success rates, the potential need for re-do ablation vs addition of anti-arrhythmic drugs, procedural risks (including groin bleeding, vascular injury, tamponade, phrenic or esophageal injury, stroke, pulmonary vein stenosis) and recovery expectations.  She will consider options further - our office will call in approximately 1 week to address any further questions  - if ablation elected upon, PVI, general anesthesia, continue apixaban, hold flecainide 3 days prior and likely resume for 6-12 weeks post ablation  - continue flecainide 50mg twice daily  - continue metoprolol 50mg twice daily  - continue apixaban 5mg twice daily  - we discussed the ongoing importance of lifestyle modification (maintaining a healthy weight, aerobic activity, sleep apnea diagnosis and management, alcohol avoidance) as part of a long term strategy for atrial fibrillation management

## 2024-06-26 NOTE — PATIENT INSTRUCTIONS
[facility]  Cardiac Electrophysiology  [address]  [place] [address]   Office: [phone]  Fax: [phone]     Thank you for seeing us in clinic today - it is a pleasure to be a part of your care team.  Below is a summary of our plan from today's visit.       You have atrial fibrillation.  We reviewed atrial fibrillation, treatment options (ablation vs antiarrhythmic drug therapy), and long term stroke risk prevention (blood thinner).    We will plan for the following:  - consider options further, and let us know with any questions or decision as to how you would like to proceed - I will ask our team to call you in approximately 1 week  - continue flecainide 50mg twice daily  - continue metoprolol 50mg twice daily  - continue apixaban 5mg twice daily     Please do not hesitate to be in touch with our office at [phone] with any questions that may arise.       Thank you for trusting us with your care,    Serena Turpin MD  Clinical Cardiac Electrophysiology  [facility]  [address]  [place] [address]   Office: [phone]  Fax: [phone]        ATRIAL FIBRILLATION: Patient Information    What is atrial fibrillation?  Atrial fibrillation (AF, A-fib) is a common heart rhythm problem (arrhythmia) occurring within the upper chambers of the heart (the atria).  In normal rhythm, the upper and lower chambers of the heart are electrically driven to contract in a coordinated sequence.  In atrial fibrillation, the atria lose their ability to contract because of rapid and chaotic electrical activity.  The lower chambers of the heart (the ventricles) continue to pump blood throughout the body, though with irregular and often faster rate due to the chaotic activity within the atria.        How do I know if I have atrial fibrillation?   Some people may feel their heart beating faster, harder, or irregularly while in atrial  fibrillation.  Others may be lightheaded, fatigued, feel weak or tired or become more short of breath especially with activities.  Some patients have no symptoms at all.  Atrial fibrillation may be found due to an irregular pulse or on an electrocardiogram (ECG). Atrial fibrillation can start and stop on its own, and episodes can last from seconds to several months.      How common is atrial fibrillation?   An estimated 3-6 million people in the [place] have atrial fibrillation.  Atrial fibrillation is a common heart rhythm problem for older persons, affecting as estimated 12-15% of people over the age of 65 years of age.    What causes atrial fibrillation?   Age is the most important risk factor for atrial fibrillation.  Atrial fibrillation is more common in people with other heart disease, high blood pressure, diabetes, obesity, sleep apnea and in people who regularly consume alcohol.  Surgery, lung disease, or thyroid problems can lead to atrial fibrillation.  Atrial fibrillation has multiple possible causes, and in most cases a single cause cannot be found.  Atrial fibrillation is a progressive condition, usually starting with at an early stage with short and infrequent episodes.  In later stages of disease, more frequent and longer lasting episodes of atrial fibrillation occur, ultimately culminating in episodes which do not spontaneously terminate.  Generally, more enlargement and scarring within the upper chambers of the heart is observed as atrial fibrillation progresses from early to late-stage disease.    How is atrial fibrillation diagnosed and evaluated?    Because of its start-stop nature, atrial fibrillation can be challenging to diagnose.  Atrial fibrillation is most commonly diagnosed via cardiac rhythm recordings - either an ECG or wearable cardiac rhythm monitor.  For patients with pacemakers, defibrillators or implantable loop recorders, atrial fibrillation may be recorded via these devices.   Recently, commercially available devices (eg. Apple Watch, [employer] device, certain FitBit devices, others) can allow patients to take 30 second cardiac rhythm recordings which may document atrial fibrillation.  Once atrial fibrillation is diagnosed, additional tests include blood tests and an echocardiogram.  The echocardiogram uses ultrasound to look at your heart to assess your cardiac function and evaluate for other heart disease.  Additional evaluation may include CT or MRI studies.    Is atrial fibrillation dangerous?   Atrial fibrillation is not usually a life-threatening arrhythmia.  The most serious consequences of atrial fibrillation including stroke and worsening of overall cardiac function.  While in atrial fibrillation, the upper cardiac chambers do not contract normally, resulting in slower blood flow and increased risk of clot formation.  If this blood clot becomes detached from the heart a stroke can occur.  Unfortunately, stroke can be the first sign of atrial fibrillation for some people.  With a stroke, you may notice abnormal sensation, weakness on one side of the body or face, changes in your vision or speech.  If you have any of these signs, you should contact EMS and be evaluated in an emergency room as soon as possible.      How is atrial fibrillation treated?     Several treatment options exist for suppressing atrial fibrillation - however, it is not an easily curable arrhythmia.  The first goal in managing atrial fibrillation is to minimize stroke risk.  The second goal is to improve symptoms associated with atrial fibrillation.  Finally, in patients with reduced cardiac function, maintaining normal rhythm can help improve cardiac function.      Blood thinners are used to reduce the risk of stroke in patients with high estimated stroke risk related to atrial fibrillation.  For patients at higher risk of bleeding related to blood thinner use, implantable devices may be an option to reduce  stroke risk without the need for long term blood thinner use.      Atrial fibrillation can be managed via two strategies: rate control and rhythm control.  In a rate control strategy, continued atrial fibrillation is accepted and medications (eg. beta-blockers or calcium channel blockers) are used to control the lower chamber rate.  In a rhythm control strategy, anti-arrhythmic medications or catheter ablation are used to maintain normal cardiac rhythm and slow disease progression by suppressing atrial fibrillation.  A procedure called a cardioversion, in which an electric shock is delivered through patches placed on the chest wall while under deep sedation, can be performed to temporarily restore normal cardiac rhythm, though does not address the chance of atrial fibrillation recurrence.  Treatments are more effective for earlier rather than later stage atrial fibrillation.  Lifestyle modifications (maintaining a healthy weight, aerobic exercise, diagnosing and treating sleep apnea, and minimizing alcohol intake) are important elements of atrial fibrillation rhythm control.     What is catheter ablation for atrial fibrillation?  Cardiac catheter ablation is a commonly performed, minimally invasive procedure performed by a cardiac electrophysiologist to treat many different cardiac rhythm abnormalities.  During catheter ablation, long, thin, flexible tubes are advanced into the heart via small sheaths inserted into the femoral veins and thermal energy (either heating or cooling) is applied within the heart to disrupt abnormal electrical activity.  Atrial fibrillation ablation is performed under general anesthesia, with procedures generally taking approximately 2-3 hours.  Patients are typically observed for 3-5 hours after the ablation, and in most cases can be discharged home the same day.  Atrial fibrillation ablation is associated with better outcomes (mortality, cardiovascular hospitalizations, atrial arrhythmia  recurrences) compared to antiarrhythmic drug therapy.  However, atrial fibrillation recurrences are not uncommon, and repeat catheter ablation may be offered.  Your electrophysiology team can review atrial fibrillation ablation, anticipated success rates, risks, and recovery expectations with you.    What are anti-arrhythmic medications?  Anti-arrhythmic medications are specialized drugs which alter cardiac electrical functioning to suppress arrhythmias.  There are several anti-arrhythmic medications available, each with its own success rate and side effects.  Some anti-arrhythmic medications are less effective though safer to use, others are more effective though have serious potential toxicities.  Atrial fibrillation recurrences are common and may require dose adjustment or change in antiarrhythmic therapy.  Your electrophysiology team will carefully consider which medication would be the best and safest for your particular case.      Can I live a normal life?    The goal of atrial fibrillation management is for patients to live normal lives without being limited by symptoms related to atrial fibrillation.    Are any additional educational resources available?  There are a number of excellent atrial fibrillation education resources available to you online.  A few options you may wish to review include:  hrsonline.org/guide-atrial-fibrillation  afibmatters.org  getsmartaboutafib.com  stopaf.com    What comes next?    Consider your management options and let us know how we can help in your decision process.  Please take medications as they have been prescribed.  You should also get any tests that may have been ordered for you.      When to Call Your Doctor or seek emergency care:  Call your doctor or seek emergency care if you have any significant changes with the following:  Weakness  Dizziness  Fainting  Fatigue  Shortness of breath  Chest pain with increased activity  If you are concerned that your heart rate is  too fast or too slow  Bleeding that does not stop in 10 minutes  Coughing or throwing up blood  Bloody diarrhea or bleeding hemorrhoids  Dark-colored urine or black stool  Allergic reactions:  Rash  Itching  Swelling  Trouble breathing or swallowing      Please call the [facility] Care Clinic at [phone] if you have concerns about your symptoms, your medicines, or your follow-up appointments.

## 2024-06-26 NOTE — LETTER
2024    Arun Good MD  [address]  [place] [address]    RE: Hedy Haq       Dear Colleague,     I had the pleasure of seeing Hedy Haq in the [facility].     [facility]  Cardiac Electrophysiology  [address]  [place] [address]   Office: [phone]  Fax: [phone]     Patient: Hedy Haq   : 1960       CHIEF COMPLAINT/REASON FOR VISIT  Paroxysmal atrial fibrillation      Assessment/Recommendations     Riofn3L/Paroxysmal atrial fibrillation - symptomatic with palpitations  [id number] 3  We reviewed atrial fibrillation physiology, managing stroke risk, cardioversion, antiarrhythmic drug therapy, and catheter ablation.  We discussed atrial fibrillation ablation procedures, anticipated success rates, the potential need for re-do ablation vs addition of anti-arrhythmic drugs, procedural risks (including groin bleeding, vascular injury, tamponade, phrenic or esophageal injury, stroke, pulmonary vein stenosis) and recovery expectations.  She will consider options further - our office will call in approximately 1 week to address any further questions  - if ablation elected upon, PVI, general anesthesia, continue apixaban, hold flecainide 3 days prior and likely resume for 6-12 weeks post ablation  - continue flecainide 50mg twice daily  - continue metoprolol 50mg twice daily  - continue apixaban 5mg twice daily  - we discussed the ongoing importance of lifestyle modification (maintaining a healthy weight, aerobic activity, sleep apnea diagnosis and management, alcohol avoidance) as part of a long term strategy for atrial fibrillation management    Follow up: as above           History of Present Illness   Hedy Haq is a 63 year old female with paroxysmal atrial fibrillation, ascending aortic aneurysm referred by Dr. Macias for consultation regarding atrial fibrillation.    Mrs.  Yuko atrial fibrillation history is as summarized below:  Symptoms: palpitations  Symptom onset date: 2020  Diagnosis date: 8/2020 [facility] [place]  Admissions/ER visits: none  Prior medical therapies: flecainide (10/2020-present)  Prior DCCVs: none  Prior ablations: none  Percutaneous left atrial appendage occlusion: none    She notes episodes of atrial fibrillation 1-2 times per week, lasting for minutes.  She denies chest pain, syncope. She is a retied geriatrics RN.         Physical Examination  Review of Systems   VITALS: /72 (BP Location: Left arm, Patient Position: Sitting, Cuff Size: Adult Regular)   Pulse (!) 46   Resp 16   Wt 118.6 kg (261 lb 8 oz)   SpO2 96%   BMI 40.96 kg/m    Wt Readings from Last 3 Encounters:   06/03/24 118.8 kg (262 lb)   06/05/23 118.4 kg (261 lb)   11/16/22 132.5 kg (292 lb)     CONSTITUTIONAL: well nourished, comfortable, no distress  EYES:  Conjunctivae pink, sclerae clear.    E/N/T:  Oral mucosa pink  RESPIRATORY:  Respiratory effort is normal  CARDIOVASCULAR:  normal S1 and S2  GASTROINTESTINAL:  Abdomen without masses or tenderness  EXTREMITIES:  No clubbing or cyanosis.    MUSCULOSKELETAL:  Overall grossly normal muscle strength  SKIN:  Overall, skin warm and dry, no lesions.  NEURO/PSYCH:  Oriented x 3 with normal affect.   Constitutional:  No weight loss or loss of appetite    Eyes:  No difficulty with vision, no double vision, no dry eyes  ENT:  No sore throat, difficulty swallowing; changes in hearing or tinnitus  Cardiovascular: As detailed above  Respiratory:  No cough  Musculoskeletal  No joint pain, muscle aches  Neurologic:  No syncope, lightheadedness, fainting spells   Hematologic: No easy bruising, excessive bleeding tendency   Gastrointestinal:  No jaundice, abdominal pain or abdominal bloating  Genitourinary: No changes in urinary habits, no trouble urinating    Psychiatric: No anxiety or depression      Medical History  Surgical History   No past  "medical history on file. No past surgical history on file.      Family History Social History   No family history on file.     Social History     Tobacco Use    Smoking status: Never    Smokeless tobacco: Never   Substance Use Topics    Drug use: Never         Medications  Allergies     Current Outpatient Medications:     apixaban ANTICOAGULANT (ELIQUIS) 5 mg Tab tablet, [APIXABAN ANTICOAGULANT (ELIQUIS) 5 MG TAB TABLET] Take 5 mg by mouth 2 (two) times a day., Disp: , Rfl:     benazepril (LOTENSIN) 40 MG tablet, Take 1 tablet by mouth daily at 2 pm, Disp: , Rfl:     chlorthalidone (HYGROTON) 25 MG tablet, TAKE 1 TABLET(25 MG) BY MOUTH DAILY, Disp: 90 tablet, Rfl: 0    flecainide (TAMBOCOR) 50 MG tablet, TAKE 1/2 TABLET(25 MG) BY MOUTH TWICE DAILY, Disp: 90 tablet, Rfl: 3    levothyroxine (SYNTHROID/LEVOTHROID) 112 MCG tablet, TAKE 2 TABLETS BY MOUTH EVERY DAY FOR THYROID, Disp: , Rfl:     metoprolol tartrate (LOPRESSOR) 50 MG tablet, Take 1 tablet by mouth 2 times daily Take 50 mg in the morning and 25 mg in the evening, Disp: , Rfl:      Allergies   Allergen Reactions    Statins [Statins] Muscle Pain (Myalgia)     Has tried atorvastatin and simvastatin           Lab Results    Chemistry CBC Cardiac Enzymes/BNP/TSH/INR   Recent Labs   Lab Test 12/01/23 0922      POTASSIUM 3.9   CHLORIDE 106   CO2 27   *   BUN 13.0   CR 0.77   GFRESTIMATED 85   TAY 9.7     Recent Labs   Lab Test 12/01/23 0922 11/30/22  1014 03/18/22  1143   CR 0.77 0.73 0.79          Recent Labs   Lab Test 12/01/23 0922   WBC 10.3   HGB 12.2   HCT 39.3   MCV 84        Recent Labs   Lab Test 12/01/23 0922 03/18/22  1143   HGB 12.2 13.6    No results for input(s): \"TROPONINI\" in the last 34159 hours.  No results for input(s): \"BNP\", \"NTBNPI\", \"NTBNP\" in the last 85274 hours.  Recent Labs   Lab Test 12/01/23 0922   TSH 0.61     No results for input(s): \"INR\" in the last 70799 hours.      Data Review    ECGs (tracings " independently reviewed)  6/3/2024 - SR with sinus arrhythmia or non-conducted PAC, 51bpm    MCT/ELVIRA monitoring 8/2020 (independently reviewed)  1. Highly abnormal 21 days event monitor demonstrating a significantly increased burden of atrial fibrillation with overall controlled ventricular rates.   Overall AF burden was 14%.   2. Nocturnal bradycardia and pauses less than 3 seconds mainly associated with AF.  3. Symptomatic transmissions for symptoms of skipped beat associated with PACs and PVCs.    8/25/2020 TTE    Left ventricle ejection fraction is normal. The calculated left ventricular ejection fraction is 59%.    Normal left ventricular size and systolic function.    Normal right ventricular size and systolic function.    Mild aortic regurgitation.    Mild mitral regurgitation.    Thoracic Aorta: The ascending aorta is severely dilated.       Cc: Alisia Macias MD, [facility] MD Serena Randall MD  6/26/2024  10:32 AM        Thank you for allowing me to participate in the care of your patient.      Sincerely,     Serena Turpin MD     [facility]  cc:   Alisia Macias MD  [address]  [place] [address]

## 2024-06-26 NOTE — PROGRESS NOTES
[facility]  Cardiac Electrophysiology  [address]  [place] [address]   Office: [phone]  Fax: [phone]     Patient: Hedy Haq   : 1960       CHIEF COMPLAINT/REASON FOR VISIT  Paroxysmal atrial fibrillation      Assessment/Recommendations     Ezyzr8F/Paroxysmal atrial fibrillation - symptomatic with palpitations  [id number] 3  We reviewed atrial fibrillation physiology, managing stroke risk, cardioversion, antiarrhythmic drug therapy, and catheter ablation.  We discussed atrial fibrillation ablation procedures, anticipated success rates, the potential need for re-do ablation vs addition of anti-arrhythmic drugs, procedural risks (including groin bleeding, vascular injury, tamponade, phrenic or esophageal injury, stroke, pulmonary vein stenosis) and recovery expectations.  She will consider options further - our office will call in approximately 1 week to address any further questions  - if ablation elected upon, PVI, general anesthesia, continue apixaban, hold flecainide 3 days prior and likely resume for 6-12 weeks post ablation  - continue flecainide 50mg twice daily  - continue metoprolol 50mg twice daily  - continue apixaban 5mg twice daily  - we discussed the ongoing importance of lifestyle modification (maintaining a healthy weight, aerobic activity, sleep apnea diagnosis and management, alcohol avoidance) as part of a long term strategy for atrial fibrillation management    Follow up: as above           History of Present Illness   Hedy Haq is a 63 year old female with paroxysmal atrial fibrillation, ascending aortic aneurysm referred by Dr. Macias for consultation regarding atrial fibrillation.    Mrs. Haq's atrial fibrillation history is as summarized below:  Symptoms: palpitations  Symptom onset date:   Diagnosis date: 2020 MCT/ELVIRA  Admissions/ER visits: none  Prior medical therapies: flecainide (10/2020-present)  Prior  DCCVs: none  Prior ablations: none  Percutaneous left atrial appendage occlusion: none    She notes episodes of atrial fibrillation 1-2 times per week, lasting for minutes.  She denies chest pain, syncope. She is a retied geriatrics RN.         Physical Examination  Review of Systems   VITALS: /72 (BP Location: Left arm, Patient Position: Sitting, Cuff Size: Adult Regular)   Pulse (!) 46   Resp 16   Wt 118.6 kg (261 lb 8 oz)   SpO2 96%   BMI 40.96 kg/m    Wt Readings from Last 3 Encounters:   06/03/24 118.8 kg (262 lb)   06/05/23 118.4 kg (261 lb)   11/16/22 132.5 kg (292 lb)     CONSTITUTIONAL: well nourished, comfortable, no distress  EYES:  Conjunctivae pink, sclerae clear.    E/N/T:  Oral mucosa pink  RESPIRATORY:  Respiratory effort is normal  CARDIOVASCULAR:  normal S1 and S2  GASTROINTESTINAL:  Abdomen without masses or tenderness  EXTREMITIES:  No clubbing or cyanosis.    MUSCULOSKELETAL:  Overall grossly normal muscle strength  SKIN:  Overall, skin warm and dry, no lesions.  NEURO/PSYCH:  Oriented x 3 with normal affect.   Constitutional:  No weight loss or loss of appetite    Eyes:  No difficulty with vision, no double vision, no dry eyes  ENT:  No sore throat, difficulty swallowing; changes in hearing or tinnitus  Cardiovascular: As detailed above  Respiratory:  No cough  Musculoskeletal  No joint pain, muscle aches  Neurologic:  No syncope, lightheadedness, fainting spells   Hematologic: No easy bruising, excessive bleeding tendency   Gastrointestinal:  No jaundice, abdominal pain or abdominal bloating  Genitourinary: No changes in urinary habits, no trouble urinating    Psychiatric: No anxiety or depression      Medical History  Surgical History   No past medical history on file. No past surgical history on file.      Family History Social History   No family history on file.     Social History     Tobacco Use    Smoking status: Never    Smokeless tobacco: Never   Substance Use Topics    Drug  "use: Never         Medications  Allergies     Current Outpatient Medications:     apixaban ANTICOAGULANT (ELIQUIS) 5 mg Tab tablet, [APIXABAN ANTICOAGULANT (ELIQUIS) 5 MG TAB TABLET] Take 5 mg by mouth 2 (two) times a day., Disp: , Rfl:     benazepril (LOTENSIN) 40 MG tablet, Take 1 tablet by mouth daily at 2 pm, Disp: , Rfl:     chlorthalidone (HYGROTON) 25 MG tablet, TAKE 1 TABLET(25 MG) BY MOUTH DAILY, Disp: 90 tablet, Rfl: 0    flecainide (TAMBOCOR) 50 MG tablet, TAKE 1/2 TABLET(25 MG) BY MOUTH TWICE DAILY, Disp: 90 tablet, Rfl: 3    levothyroxine (SYNTHROID/LEVOTHROID) 112 MCG tablet, TAKE 2 TABLETS BY MOUTH EVERY DAY FOR THYROID, Disp: , Rfl:     metoprolol tartrate (LOPRESSOR) 50 MG tablet, Take 1 tablet by mouth 2 times daily Take 50 mg in the morning and 25 mg in the evening, Disp: , Rfl:      Allergies   Allergen Reactions    Statins [Statins] Muscle Pain (Myalgia)     Has tried atorvastatin and simvastatin           Lab Results    Chemistry CBC Cardiac Enzymes/BNP/TSH/INR   Recent Labs   Lab Test 12/01/23 0922      POTASSIUM 3.9   CHLORIDE 106   CO2 27   *   BUN 13.0   CR 0.77   GFRESTIMATED 85   TAY 9.7     Recent Labs   Lab Test 12/01/23 0922 11/30/22  1014 03/18/22  1143   CR 0.77 0.73 0.79          Recent Labs   Lab Test 12/01/23 0922   WBC 10.3   HGB 12.2   HCT 39.3   MCV 84        Recent Labs   Lab Test 12/01/23 0922 03/18/22  1143   HGB 12.2 13.6    No results for input(s): \"TROPONINI\" in the last 98156 hours.  No results for input(s): \"BNP\", \"NTBNPI\", \"NTBNP\" in the last 16695 hours.  Recent Labs   Lab Test 12/01/23 0922   TSH 0.61     No results for input(s): \"INR\" in the last 91217 hours.      Data Review    ECGs (tracings independently reviewed)  6/3/2024 - SR with sinus arrhythmia or non-conducted PAC, 51bpm    MCT/ELVIRA monitoring 8/2020 (independently reviewed)  1. Highly abnormal 21 days event monitor demonstrating a significantly increased burden of atrial " fibrillation with overall controlled ventricular rates.   Overall AF burden was 14%.   2. Nocturnal bradycardia and pauses less than 3 seconds mainly associated with AF.  3. Symptomatic transmissions for symptoms of skipped beat associated with PACs and PVCs.    8/25/2020 TTE    Left ventricle ejection fraction is normal. The calculated left ventricular ejection fraction is 59%.    Normal left ventricular size and systolic function.    Normal right ventricular size and systolic function.    Mild aortic regurgitation.    Mild mitral regurgitation.    Thoracic Aorta: The ascending aorta is severely dilated.       Cc: Alisia Macias MD, Arun Good MD Amila Dilusha William, MD  6/26/2024  10:32 AM

## 2024-06-27 DIAGNOSIS — I48.0 PAROXYSMAL ATRIAL FIBRILLATION (H): ICD-10-CM

## 2024-06-28 RX ORDER — FLECAINIDE ACETATE 50 MG/1
TABLET ORAL
Qty: 90 TABLET | Refills: 3 | Status: SHIPPED | OUTPATIENT
Start: 2024-06-28 | End: 2024-09-27

## 2024-07-03 ENCOUNTER — PREP FOR PROCEDURE (OUTPATIENT)
Dept: CARDIOLOGY | Facility: CLINIC | Age: 64
End: 2024-07-03
Payer: COMMERCIAL

## 2024-07-03 ENCOUNTER — DOCUMENTATION ONLY (OUTPATIENT)
Dept: CARDIOLOGY | Facility: CLINIC | Age: 64
End: 2024-07-03
Payer: COMMERCIAL

## 2024-07-03 DIAGNOSIS — I48.0 PAROXYSMAL ATRIAL FIBRILLATION (H): Primary | ICD-10-CM

## 2024-07-03 RX ORDER — SODIUM CHLORIDE 9 MG/ML
100 INJECTION, SOLUTION INTRAVENOUS CONTINUOUS
Status: CANCELLED | OUTPATIENT
Start: 2024-08-16

## 2024-07-03 RX ORDER — LIDOCAINE 40 MG/G
CREAM TOPICAL
Status: CANCELLED | OUTPATIENT
Start: 2024-07-03

## 2024-07-03 NOTE — TELEPHONE ENCOUNTER
Spoke with pt and she would like to proceed with an ablation. Procedure prepped and sent to scheduling team.     Charlotte Ansari RN

## 2024-07-03 NOTE — PROGRESS NOTES
H&P:  Card OV  Date:  EP SHELLI [] PVI  Order Case Req Y  Order Set Y Lab/EKG   Orders [] Imaging Order None     AC Eliquis-CONTINUE   AAD Flecainide- Hold 3 days prior  Metoprolol-Continue   PPI/H2 Blocker Start Protonix 40mg Daily 3 days prior, 6wk post   Diuretics None   DM/GLP-1 DM Meds- None  GLP-1- None   - if ablation elected upon, PVI, general anesthesia, continue apixaban, hold flecainide 3 days prior and likely resume for 6-12 weeks post ablation    Hedy LUGO Severino, 1960, [id number]  Home:[phone] (home) Cell:[phone] (mobile)  Emergency Contact: Clovis Haq   PCP: Arun Good, [phone]    Important patient information for CSC/Cath Lab staff : None    [facility] EP Cath Lab Procedure Order   Ablation Type:  PVI- Atrial Fibrillation  Ordering Provider: Dr Papi Mills Ordered and Prepped: 7/3/2024 Charlotte Ansari RN    Scheduling Information:  Anticipated Case Duration:  Standard ( Case per day SA 2:1, DW 5:1, KA 3:1)   Scheduling Timeframe:  Next Available  Scheduling Restrictions: None  Scheduling Contact: Please contact pt to schedule, if you are unable to schedule date within the next 24 hours please contact pt to update on scheduling process  EP RN Follow Up Apt: Schedule EP RN PC visit 3-4 days s/p PVI  MD Preference: Scheduling with ordering provider  Current Device/Device Co Needed for Procedure: None NoneNone  Pre-Procedural Testing needed: None  Mapping System Required:  Carto (Dr Turpin and Dr Desir)  ICE Needed:  Yes  Anesthesia:General Anesthesia    [facility] EP Cath Lab Prep   H&P:  Compled by cardiology on 6/26/24 if scheduled within 30 days, pt will need RN education and Labs apt within 3 days of procedure. If pts PVI scheduled outside of 30 days, pt to schedule H&P with EP SHELLI, RN Teach, and Labs within 30 days of PVI  Pre-op Labs: CBC, BMP, Beta HcG if appropriate, and INR if on Warfarin will be ordered AM of procedure, if not completed at pre-op H&P within 7 days  of procedure.  T&S Pre-Procedure Review: Does not need for PVI procedures  Medical Records Pertinent for Procedure:   EKG 6/3/24  Iodinated Contrast Dye Allergies (Does not include Shellfish, Egg, and/or Iodine Allergy): NA  GLP-1 Protocol: If on Dulaglutide (Trulicity) (weekly)- Injection hold 7 days prior to procedure  , Exenatide extended release (Bydureon bcise) (weekly)- Injection hold 7 days prior to procedure, Exenatide (Byetta) (twice daily)- Oral Tablet hold day prior and morning of procedure and for Injection hold 7 days prior to procedure, Semaglutide (Ozempic) (weekly)- Injection and Oral hold 7 days prior to procedure, Liraglutide (Victoza, Saxenda) (daily)- Injection hold day prior and morning of procedure  Follow Up S/P: EP RN 3-4 PC Visit and EP SHELLI 6wk (To be scheduled at time of case scheduling)    Allergies   Allergen Reactions    Statins [Statins] Muscle Pain (Myalgia)     Has tried atorvastatin and simvastatin        Current Outpatient Medications:     apixaban ANTICOAGULANT (ELIQUIS) 5 mg Tab tablet, [APIXABAN ANTICOAGULANT (ELIQUIS) 5 MG TAB TABLET] Take 5 mg by mouth 2 (two) times a day., Disp: , Rfl:     benazepril (LOTENSIN) 40 MG tablet, Take 1 tablet by mouth daily at 2 pm, Disp: , Rfl:     chlorthalidone (HYGROTON) 25 MG tablet, TAKE 1 TABLET(25 MG) BY MOUTH DAILY, Disp: 90 tablet, Rfl: 0    flecainide (TAMBOCOR) 50 MG tablet, TAKE 1/2 TABLET(25 MG) BY MOUTH TWICE DAILY, Disp: 90 tablet, Rfl: 3    levothyroxine (SYNTHROID/LEVOTHROID) 112 MCG tablet, TAKE 2 TABLETS BY MOUTH EVERY DAY FOR THYROID, Disp: , Rfl:     metoprolol tartrate (LOPRESSOR) 50 MG tablet, Take 1 tablet by mouth 2 times daily Take 50 mg in the morning and 25 mg in the evening, Disp: , Rfl:     Documentation Date:7/3/2024 10:26 AM  Charlotte Ansari RN

## 2024-07-15 DIAGNOSIS — I10 ESSENTIAL HYPERTENSION, BENIGN: ICD-10-CM

## 2024-07-15 RX ORDER — CHLORTHALIDONE 25 MG/1
TABLET ORAL
Qty: 90 TABLET | Refills: 1 | Status: SHIPPED | OUTPATIENT
Start: 2024-07-15

## 2024-08-12 ENCOUNTER — ALLIED HEALTH/NURSE VISIT (OUTPATIENT)
Dept: CARDIOLOGY | Facility: CLINIC | Age: 64
End: 2024-08-12
Payer: COMMERCIAL

## 2024-08-12 ENCOUNTER — LAB (OUTPATIENT)
Dept: CARDIOLOGY | Facility: CLINIC | Age: 64
End: 2024-08-12
Payer: COMMERCIAL

## 2024-08-12 ENCOUNTER — OFFICE VISIT (OUTPATIENT)
Dept: CARDIOLOGY | Facility: CLINIC | Age: 64
End: 2024-08-12
Payer: COMMERCIAL

## 2024-08-12 VITALS
BODY MASS INDEX: 41.75 KG/M2 | HEART RATE: 47 BPM | RESPIRATION RATE: 16 BRPM | DIASTOLIC BLOOD PRESSURE: 68 MMHG | HEIGHT: 67 IN | SYSTOLIC BLOOD PRESSURE: 130 MMHG | WEIGHT: 266 LBS

## 2024-08-12 DIAGNOSIS — G47.33 OSA (OBSTRUCTIVE SLEEP APNEA): ICD-10-CM

## 2024-08-12 DIAGNOSIS — I48.0 PAROXYSMAL ATRIAL FIBRILLATION (H): Primary | ICD-10-CM

## 2024-08-12 DIAGNOSIS — I10 ESSENTIAL HYPERTENSION, BENIGN: ICD-10-CM

## 2024-08-12 DIAGNOSIS — I48.0 PAROXYSMAL ATRIAL FIBRILLATION (H): ICD-10-CM

## 2024-08-12 LAB
ANION GAP SERPL CALCULATED.3IONS-SCNC: <1 MMOL/L (ref 7–15)
ATRIAL RATE - MUSE: 47 BPM
BUN SERPL-MCNC: 15.1 MG/DL (ref 8–23)
CALCIUM SERPL-MCNC: 9.6 MG/DL (ref 8.8–10.4)
CHLORIDE SERPL-SCNC: 102 MMOL/L (ref 98–107)
CREAT SERPL-MCNC: 0.84 MG/DL (ref 0.51–0.95)
DIASTOLIC BLOOD PRESSURE - MUSE: NORMAL MMHG
EGFRCR SERPLBLD CKD-EPI 2021: 77 ML/MIN/1.73M2
ERYTHROCYTE [DISTWIDTH] IN BLOOD BY AUTOMATED COUNT: 15.6 % (ref 10–15)
GLUCOSE SERPL-MCNC: 102 MG/DL (ref 70–99)
HCO3 SERPL-SCNC: 31 MMOL/L (ref 22–29)
HCT VFR BLD AUTO: 37.4 % (ref 35–47)
HGB BLD-MCNC: 11.2 G/DL (ref 11.7–15.7)
INTERPRETATION ECG - MUSE: NORMAL
MCH RBC QN AUTO: 24 PG (ref 26.5–33)
MCHC RBC AUTO-ENTMCNC: 29.9 G/DL (ref 31.5–36.5)
MCV RBC AUTO: 80 FL (ref 78–100)
P AXIS - MUSE: -17 DEGREES
PLATELET # BLD AUTO: 306 10E3/UL (ref 150–450)
POTASSIUM SERPL-SCNC: 4.4 MMOL/L (ref 3.4–5.3)
PR INTERVAL - MUSE: 194 MS
QRS DURATION - MUSE: 86 MS
QT - MUSE: 444 MS
QTC - MUSE: 392 MS
R AXIS - MUSE: -4 DEGREES
RBC # BLD AUTO: 4.66 10E6/UL (ref 3.8–5.2)
SODIUM SERPL-SCNC: 133 MMOL/L (ref 135–145)
SYSTOLIC BLOOD PRESSURE - MUSE: NORMAL MMHG
T AXIS - MUSE: 67 DEGREES
VENTRICULAR RATE- MUSE: 47 BPM
WBC # BLD AUTO: 7.3 10E3/UL (ref 4–11)

## 2024-08-12 PROCEDURE — 80048 BASIC METABOLIC PNL TOTAL CA: CPT

## 2024-08-12 PROCEDURE — 99214 OFFICE O/P EST MOD 30 MIN: CPT | Performed by: NURSE PRACTITIONER

## 2024-08-12 PROCEDURE — 93000 ELECTROCARDIOGRAM COMPLETE: CPT | Performed by: INTERNAL MEDICINE

## 2024-08-12 PROCEDURE — G2211 COMPLEX E/M VISIT ADD ON: HCPCS | Performed by: NURSE PRACTITIONER

## 2024-08-12 PROCEDURE — 36415 COLL VENOUS BLD VENIPUNCTURE: CPT

## 2024-08-12 PROCEDURE — 85027 COMPLETE CBC AUTOMATED: CPT

## 2024-08-12 PROCEDURE — 99207 PR NO CHARGE NURSE ONLY: CPT

## 2024-08-12 RX ORDER — PANTOPRAZOLE SODIUM 40 MG/1
40 TABLET, DELAYED RELEASE ORAL DAILY
Qty: 45 TABLET | Refills: 0 | Status: SHIPPED | OUTPATIENT
Start: 2024-08-13 | End: 2024-09-27

## 2024-08-12 NOTE — PROGRESS NOTES
Pre-Procedure Pulmonary Vein Ablation (AF) Education    Procedure: PVI with Dr Turpin on 8/16 with arrival time 5:30am    COVID: Pt denies COVID like symptoms, and is aware if he/she develops COVID like symptoms they would need to complete an at home with a rapid antigen COVID test 1-2 days prior to your procedure date. If COVID + pt is aware the procedure will need to be rescheduled, and to contact CV scheduling as soon as possible    Type & Screen: Is not required for PVI Ablation    Pre-Op H&P: Completed today with EP SHELLI- See record in Epic    Education:   Reviewed with pt in Clinic today  Pre-Procedure Instruction: NPO after midnight pre procedure, Defined NPO, Remove all jewelry and leave all valuables at home, Shower prior to arrival, Anesthesia and intubation plan/orders, Intra-procedure PVI process, Post- PVI procedure expectations/recovery, Transportation requirements and arrangements post procedure, Post-procedure follow up process, Letter sent to pt via [employer] and mail with written instructions (Refer to letters tab), Lab results would be called to pt if abnormal  Risks:   Atrial Fibrillation Ablation/Left Atrial Ablation  Cardiac Ablation  <1% Hypotension, Hemorrhage, Thrombophlebitis, Systemic or pulmonic emboli, Cardiac perforation (tamponade), Infection, Pneumothorax, Arrhythmias, Proarrhythmic effects of drugs, Radiation exposure, Catheter entrapment  <1 % Vascular injury including perforation of vein, artery or heart  1-2% Tamponade and Aortic puncture with left sided transeptal approach  1% CVA   <1% MI  <0.1% death  If external defibrillation or CV is needed, 25% risk for superficial burn  Risks associated with general anesthesia will be addressed by the Anesthesiology Department  Radiofrequency Risks:  In addition to standard risks for Radiofrequency Ablation, there is:  <2% Significant pulmonary vein stenosis  <2% Embolic events  <1% Esophageal fistula  <1% Phrenic nerve paralysis    Cryoablation Risks:  In addition to standard risks for Cryoablation Ablation, there is:  <1% Phrenic nerve paralysis  <1% Pulmonary vein stenosis  <1% Esophageal fistula    Medication:   Instructions regarding anticoagulants: Eliquis- Continue anticoagulation uninterrupted through their procedure, do not miss any doses of AC prior to procedure, importance of taking AC for stroke prevention, taking AC as prescribed, to call prior to PVI if missed a dose of AC, and if upon arrival pt reports missing a dose of AC PVI will potentially be cnx/postponed  Instructions given to pt regarding antiarrhythmic medication: Flecainide- Hold 3 days prior to procedure  Instructions given to pt regarding PPI medication: Start Protonix 40mg Daily 3 days prior, 6wk post  Instructions given to pt regarding diuretics medication: None  Instructions given to pt regarding DM/GLP-1 medication:   DM- None  GLP-1- None  Instructions for medication, other than anticoagulants and antiarrhythmics listed above, given to pt: Take all medication AM of procedure with small sips of water     Important patient information for staff: None    8/12/2024 8:50 AM  Yenni Cherry RN

## 2024-08-12 NOTE — LETTER
8/12/2024    Arun Good MD  [address]  [place] [address]    RE: Hedy LUGO Severino       Dear Colleague,     I had the pleasure of seeing Hedy Haq in the [facility].    HEART CARE ELECTROPHYSIOLOGY NOTE      [facility]  [phone]      Assessment/Recommendations   Assessment/Plan:  1. Stage 3A/paroxysmal atrial fibrillation:  Initially diagnosed 8/2020.  Symptoms consist of palpitations.  She is scheduled for pulmonary vein isolation ablation with Dr. Turpin on 8/16/2024.  We discussed ablation, <1-2% risk for complication, expected recovery, and follow-up.  She was instructed to hold flecainide 3 days prior to ablation, continue metoprolol.  Start pantoprazole 40 mg daily 3 days prior to ablation, to continue for 6 weeks after ablation.  She states that her questions were answered to her satisfaction and she is ready to proceed.    She has a [id number] score of 3 for age 65-74, female gender, HTN.  HAS-BLED score of 1 for age.     -- Continue Eliquis 5 mg twice daily for stroke prophylaxis.  She reports no missed doses or bleeding issues.      2.  Hypertension: Controlled with metoprolol, benazepril, chlorthalidone    3.  Obstructive sleep apnea: Consistent use of CPAP nightly.  We discussed the correlation between untreated sleep apnea and atrial arrhythmias.  She was encouraged to maintain compliance with CPAP therapy.  She was instructed to bring her CPAP to the hospital to use after ablation.    Telephone follow up with EP RN on 8/20/2024  Follow-up with me on 9/27/2024, 6 weeks post PVI     History of Present Illness/Subjective    HPI: Hedy Haq is a 63 year old female who comes in for EP follow up of atrial fibrillation and history and physical prior to pulmonary vein isolation ablation.  She has a history of paroxysmal atrial fibrillation, ascending aortic aneurysm, hypertension, KAREN-on CPAP,  hypothyroidism.    Arrhythmia history  Dx/date: 8/2020 by MCT.  Symptom onset 2020.  Sx: Palpitations  [id number] score: 3 for age 65-74, female gender, HTN  Oral anticoagulation: 1 for age   Antiarrhythmic medications, AV linda blocking agents: flecainide (10/2020-present)   Procedures  DCCV: None  Ablation: Scheduled 8/16/2024 with Dr. Papi Knott.  She denies chest discomfort, palpitations, abdominal fullness/bloating or peripheral edema, shortness of breath, paroxysmal nocturnal dyspnea, orthopnea, lightheadedness, dizziness, pre-syncope, or syncope.    Cardiographics (EKG personally reviewed):  EKG done 8/12/2024 shows sinus bradycardia at 47 bpm, QRS 86 ms, QT/QTc 440/392 ms.    ELVIRA worn 8/25/2020:  1. Highly abnormal 21 days event monitor demonstrating a significantly increased burden of atrial fibrillation with overall controlled ventricular rates.   Overall AF burden was 14%.   2. Nocturnal bradycardia and pauses less than 3 seconds mainly associated with AF.  3. Symptomatic transmissions for symptoms of skipped beat associated with PACs and PVCs.    ECHO done 8/25/2020:     1.Negative pharmacological regadenoson ECG for ischemia.     2.The nuclear stress test is negative for inducible myocardial ischemia or infarction.  Below defect is felt to represent breast attenuation on perfusion diagram.     3.The left ventricular ejection fraction at stress is 59%.     4.There is no prior study for comparison.    NM stress test done 8/25/2020:     1.Negative pharmacological regadenoson ECG for ischemia.     2.The nuclear stress test is negative for inducible myocardial ischemia or infarction.  Below defect is felt to represent breast attenuation on perfusion diagram.     3.The left ventricular ejection fraction at stress is 59%.     4.There is no prior study for comparison.    I have reviewed and updated the patient's Past Medical History, Social History, Family History and Medication List.  Outside records  "personally reviewed.     Physical Examination  Review of Systems   Vitals: /68 (BP Location: Left arm, Patient Position: Sitting, Cuff Size: Adult Large)   Pulse (!) 47   Resp 16   Ht 1.702 m (5' 7\")   Wt 120.7 kg (266 lb)   BMI 41.66 kg/m    BMI= Body mass index is 41.66 kg/m .  Wt Readings from Last 3 Encounters:   08/12/24 120.7 kg (266 lb)   06/26/24 118.6 kg (261 lb 8 oz)   06/03/24 118.8 kg (262 lb)       General Appearance:   Alert, well-appearing and in no acute distress.   HEENT: Atraumatic, normocephalic.  No scleral icterus, normal conjunctivae, EOMs intact, PERRL.  Mucous membranes pink and moist.     Chest/Lungs:   Chest symmetric, spine straight.  Respirations unlabored.  Lungs are clear to auscultation.   Cardiovascular:   Regular rate and rhythm.  Normal first and second heart sounds with no murmurs, rubs, or gallops; radial and posterior tibial pulses are intact, no edema.   Abdomen:  Soft, nondistended, bowel sounds present.   Extremities: No cyanosis or clubbing.   Musculoskeletal: Moves all extremities.     Skin: Warm, dry, intact.    Neurologic: Mood and affect are appropriate.  Alert and oriented to person, place, time, and situation.     ROS: 10 point ROS neg other than the symptoms noted above in the HPI.         Medical History  Surgical History Family History Social History   Past Medical History:   Diagnosis Date     Acquired hypothyroidism 08/03/2020     Essential hypertension, benign 08/03/2020     KAREN (obstructive sleep apnea) 10/19/2020     Paroxysmal atrial fibrillation (H) 08/03/2020     Sinus bradycardia 06/05/2023     Thoracic aortic aneurysm without rupture (H24) 10/19/2020     Past Surgical History:   Procedure Laterality Date     CATARACT EXTRACTION Bilateral      RADIOFREQUENCY ABLATION, UTERINE       TUBAL LIGATION       Family History   Problem Relation Age of Onset     Hypertension Mother      Hypothyroidism Mother      Hypertension Father      Atrial fibrillation " Father         Social History     Socioeconomic History     Marital status:      Spouse name: Not on file     Number of children: Not on file     Years of education: Not on file     Highest education level: Not on file   Occupational History     Not on file   Tobacco Use     Smoking status: Never     Smokeless tobacco: Never   Substance and Sexual Activity     Alcohol use: Not on file     Drug use: Never     Sexual activity: Not on file   Other Topics Concern     Not on file   Social History Narrative     Not on file     Social Determinants of Health     Financial Resource Strain: Not on file   Food Insecurity: Not on file   Transportation Needs: Not on file   Physical Activity: Not on file   Stress: Not on file   Social Connections: Not on file   Interpersonal Safety: Not on file   Housing Stability: Not on file           Medications  Allergies   Current Outpatient Medications   Medication Sig Dispense Refill     apixaban ANTICOAGULANT (ELIQUIS) 5 mg Tab tablet [APIXABAN ANTICOAGULANT (ELIQUIS) 5 MG TAB TABLET] Take 5 mg by mouth 2 (two) times a day.       benazepril (LOTENSIN) 40 MG tablet Take 1 tablet by mouth daily at 2 pm       chlorthalidone (HYGROTON) 25 MG tablet TAKE 1 TABLET(25 MG) BY MOUTH DAILY 90 tablet 1     flecainide (TAMBOCOR) 50 MG tablet TAKE 1/2 TABLET(25 MG) BY MOUTH TWICE DAILY 90 tablet 3     levothyroxine (SYNTHROID/LEVOTHROID) 112 MCG tablet TAKE 2 TABLETS BY MOUTH EVERY DAY FOR THYROID       metoprolol tartrate (LOPRESSOR) 50 MG tablet Take 1 tablet by mouth 2 times daily Take 50 mg in the morning and 25 mg in the evening       [START ON 8/13/2024] pantoprazole (PROTONIX) 40 MG EC tablet Take 1 tablet (40 mg) by mouth daily Continue for 6 weeks after ablation 45 tablet 0       Allergies   Allergen Reactions     Statins [Statins] Muscle Pain (Myalgia)     Has tried atorvastatin and simvastatin       Denies previous adverse reaction to anesthesia      Lab Results    Chemistry/lipid  "CBC Cardiac Enzymes/BNP/TSH/INR   Recent Labs   Lab Test 12/01/23 0922   CHOL 201*   HDL 66   *   TRIG 108     Recent Labs   Lab Test 12/01/23 0922 11/30/22  1014 11/26/21  1039   * 139* 140*     Recent Labs   Lab Test 12/01/23 0922      POTASSIUM 3.9   CHLORIDE 106   CO2 27   *   BUN 13.0   CR 0.77   GFRESTIMATED 85   TAY 9.7     Recent Labs   Lab Test 12/01/23 0922 11/30/22  1014 03/18/22  1143   CR 0.77 0.73 0.79     BMP and CBC drawn today, results pending   Recent Labs   Lab Test 12/01/23 0922   WBC 10.3   HGB 12.2   HCT 39.3   MCV 84        Recent Labs   Lab Test 12/01/23 0922 03/18/22  1143   HGB 12.2 13.6    No results for input(s): \"TROPONINI\" in the last 35029 hours.  No results for input(s): \"BNP\", \"NTBNPI\", \"NTBNP\" in the last 31338 hours.  Recent Labs   Lab Test 12/01/23 0922   TSH 0.61     No results for input(s): \"INR\" in the last 85634 hours.     The longitudinal plan of care for the diagnosis(es)/condition(s) as documented were addressed during this visit. Due to the added complexity in care, I will continue to support Hedy in the subsequent management and with ongoing continuity of care.                                             Thank you for allowing me to participate in the care of your patient.      Sincerely,     SERGEY Lim [facility]  cc:   Arun Good MD  [address] [place] [address]      "

## 2024-08-12 NOTE — PROGRESS NOTES
HEART CARE ELECTROPHYSIOLOGY NOTE      [facility]  [phone]      Assessment/Recommendations   Assessment/Plan:  1. Stage 3A/paroxysmal atrial fibrillation:  Initially diagnosed 8/2020.  Symptoms consist of palpitations.  She is scheduled for pulmonary vein isolation ablation with Dr. Turpin on 8/16/2024.  We discussed ablation, <1-2% risk for complication, expected recovery, and follow-up.  She was instructed to hold flecainide 3 days prior to ablation, continue metoprolol.  Start pantoprazole 40 mg daily 3 days prior to ablation, to continue for 6 weeks after ablation.  She states that her questions were answered to her satisfaction and she is ready to proceed.    She has a [id number] score of 3 for age 65-74, female gender, HTN.  HAS-BLED score of 1 for age.     -- Continue Eliquis 5 mg twice daily for stroke prophylaxis.  She reports no missed doses or bleeding issues.      2.  Hypertension: Controlled with metoprolol, benazepril, chlorthalidone    3.  Obstructive sleep apnea: Consistent use of CPAP nightly.  We discussed the correlation between untreated sleep apnea and atrial arrhythmias.  She was encouraged to maintain compliance with CPAP therapy.  She was instructed to bring her CPAP to the hospital to use after ablation.    Telephone follow up with EP RN on 8/20/2024  Follow-up with me on 9/27/2024, 6 weeks post PVI     History of Present Illness/Subjective    HPI: Hedy Haq is a 63 year old female who comes in for EP follow up of atrial fibrillation and history and physical prior to pulmonary vein isolation ablation.  She has a history of paroxysmal atrial fibrillation, ascending aortic aneurysm, hypertension, KAREN-on CPAP, hypothyroidism.    Arrhythmia history  Dx/date: 8/2020 by MCT.  Symptom onset 2020.  Sx: Palpitations  [id number] score: 3 for age 65-74, female gender, HTN  Oral anticoagulation: 1 for age   Antiarrhythmic medications, AV linda blocking agents:  "flecainide (10/2020-present)   Procedures  DCCV: None  Ablation: Scheduled 8/16/2024 with Dr. Papi Knott.  She denies chest discomfort, palpitations, abdominal fullness/bloating or peripheral edema, shortness of breath, paroxysmal nocturnal dyspnea, orthopnea, lightheadedness, dizziness, pre-syncope, or syncope.    Cardiographics (EKG personally reviewed):  EKG done 8/12/2024 shows sinus bradycardia at 47 bpm, QRS 86 ms, QT/QTc 440/392 ms.    ELVIRA worn 8/25/2020:  1. Highly abnormal 21 days event monitor demonstrating a significantly increased burden of atrial fibrillation with overall controlled ventricular rates.   Overall AF burden was 14%.   2. Nocturnal bradycardia and pauses less than 3 seconds mainly associated with AF.  3. Symptomatic transmissions for symptoms of skipped beat associated with PACs and PVCs.    ECHO done 8/25/2020:    1.Negative pharmacological regadenoson ECG for ischemia.    2.The nuclear stress test is negative for inducible myocardial ischemia or infarction.  Below defect is felt to represent breast attenuation on perfusion diagram.    3.The left ventricular ejection fraction at stress is 59%.    4.There is no prior study for comparison.    NM stress test done 8/25/2020:    1.Negative pharmacological regadenoson ECG for ischemia.    2.The nuclear stress test is negative for inducible myocardial ischemia or infarction.  Below defect is felt to represent breast attenuation on perfusion diagram.    3.The left ventricular ejection fraction at stress is 59%.    4.There is no prior study for comparison.    I have reviewed and updated the patient's Past Medical History, Social History, Family History and Medication List.  Outside records personally reviewed.     Physical Examination  Review of Systems   Vitals: /68 (BP Location: Left arm, Patient Position: Sitting, Cuff Size: Adult Large)   Pulse (!) 47   Resp 16   Ht 1.702 m (5' 7\")   Wt 120.7 kg (266 lb)   BMI 41.66 kg/m  "   BMI= Body mass index is 41.66 kg/m .  Wt Readings from Last 3 Encounters:   08/12/24 120.7 kg (266 lb)   06/26/24 118.6 kg (261 lb 8 oz)   06/03/24 118.8 kg (262 lb)       General Appearance:   Alert, well-appearing and in no acute distress.   HEENT: Atraumatic, normocephalic.  No scleral icterus, normal conjunctivae, EOMs intact, PERRL.  Mucous membranes pink and moist.     Chest/Lungs:   Chest symmetric, spine straight.  Respirations unlabored.  Lungs are clear to auscultation.   Cardiovascular:   Regular rate and rhythm.  Normal first and second heart sounds with no murmurs, rubs, or gallops; radial and posterior tibial pulses are intact, no edema.   Abdomen:  Soft, nondistended, bowel sounds present.   Extremities: No cyanosis or clubbing.   Musculoskeletal: Moves all extremities.     Skin: Warm, dry, intact.    Neurologic: Mood and affect are appropriate.  Alert and oriented to person, place, time, and situation.     ROS: 10 point ROS neg other than the symptoms noted above in the HPI.         Medical History  Surgical History Family History Social History   Past Medical History:   Diagnosis Date    Acquired hypothyroidism 08/03/2020    Essential hypertension, benign 08/03/2020    KAREN (obstructive sleep apnea) 10/19/2020    Paroxysmal atrial fibrillation (H) 08/03/2020    Sinus bradycardia 06/05/2023    Thoracic aortic aneurysm without rupture (H24) 10/19/2020     Past Surgical History:   Procedure Laterality Date    CATARACT EXTRACTION Bilateral     RADIOFREQUENCY ABLATION, UTERINE      TUBAL LIGATION       Family History   Problem Relation Age of Onset    Hypertension Mother     Hypothyroidism Mother     Hypertension Father     Atrial fibrillation Father         Social History     Socioeconomic History    Marital status:      Spouse name: Not on file    Number of children: Not on file    Years of education: Not on file    Highest education level: Not on file   Occupational History    Not on file    Tobacco Use    Smoking status: Never    Smokeless tobacco: Never   Substance and Sexual Activity    Alcohol use: Not on file    Drug use: Never    Sexual activity: Not on file   Other Topics Concern    Not on file   Social History Narrative    Not on file     Social Determinants of Health     Financial Resource Strain: Not on file   Food Insecurity: Not on file   Transportation Needs: Not on file   Physical Activity: Not on file   Stress: Not on file   Social Connections: Not on file   Interpersonal Safety: Not on file   Housing Stability: Not on file           Medications  Allergies   Current Outpatient Medications   Medication Sig Dispense Refill    apixaban ANTICOAGULANT (ELIQUIS) 5 mg Tab tablet [APIXABAN ANTICOAGULANT (ELIQUIS) 5 MG TAB TABLET] Take 5 mg by mouth 2 (two) times a day.      benazepril (LOTENSIN) 40 MG tablet Take 1 tablet by mouth daily at 2 pm      chlorthalidone (HYGROTON) 25 MG tablet TAKE 1 TABLET(25 MG) BY MOUTH DAILY 90 tablet 1    flecainide (TAMBOCOR) 50 MG tablet TAKE 1/2 TABLET(25 MG) BY MOUTH TWICE DAILY 90 tablet 3    levothyroxine (SYNTHROID/LEVOTHROID) 112 MCG tablet TAKE 2 TABLETS BY MOUTH EVERY DAY FOR THYROID      metoprolol tartrate (LOPRESSOR) 50 MG tablet Take 1 tablet by mouth 2 times daily Take 50 mg in the morning and 25 mg in the evening      [START ON 8/13/2024] pantoprazole (PROTONIX) 40 MG EC tablet Take 1 tablet (40 mg) by mouth daily Continue for 6 weeks after ablation 45 tablet 0       Allergies   Allergen Reactions    Statins [Statins] Muscle Pain (Myalgia)     Has tried atorvastatin and simvastatin       Denies previous adverse reaction to anesthesia      Lab Results    Chemistry/lipid CBC Cardiac Enzymes/BNP/TSH/INR   Recent Labs   Lab Test 12/01/23  0922   CHOL 201*   HDL 66   *   TRIG 108     Recent Labs   Lab Test 12/01/23  0922 11/30/22  1014 11/26/21  1039   * 139* 140*     Recent Labs   Lab Test 12/01/23  0922      POTASSIUM 3.9  "  CHLORIDE 106   CO2 27   *   BUN 13.0   CR 0.77   GFRESTIMATED 85   TAY 9.7     Recent Labs   Lab Test 12/01/23 0922 11/30/22  1014 03/18/22  1143   CR 0.77 0.73 0.79     BMP and CBC drawn today, results pending   Recent Labs   Lab Test 12/01/23 0922   WBC 10.3   HGB 12.2   HCT 39.3   MCV 84        Recent Labs   Lab Test 12/01/23 0922 03/18/22  1143   HGB 12.2 13.6    No results for input(s): \"TROPONINI\" in the last 66309 hours.  No results for input(s): \"BNP\", \"NTBNPI\", \"NTBNP\" in the last 17921 hours.  Recent Labs   Lab Test 12/01/23 0922   TSH 0.61     No results for input(s): \"INR\" in the last 98101 hours.     The longitudinal plan of care for the diagnosis(es)/condition(s) as documented were addressed during this visit. Due to the added complexity in care, I will continue to support Hedy in the subsequent management and with ongoing continuity of care.                                         "

## 2024-08-12 NOTE — H&P (VIEW-ONLY)
HEART CARE ELECTROPHYSIOLOGY NOTE      [facility]  [phone]      Assessment/Recommendations   Assessment/Plan:  1. Stage 3A/paroxysmal atrial fibrillation:  Initially diagnosed 8/2020.  Symptoms consist of palpitations.  She is scheduled for pulmonary vein isolation ablation with Dr. Turpin on 8/16/2024.  We discussed ablation, <1-2% risk for complication, expected recovery, and follow-up.  She was instructed to hold flecainide 3 days prior to ablation, continue metoprolol.  Start pantoprazole 40 mg daily 3 days prior to ablation, to continue for 6 weeks after ablation.  She states that her questions were answered to her satisfaction and she is ready to proceed.    She has a [id number]-WCNn score of 3 for age 65-74, female gender, HTN.  HAS-BLED score of 1 for age.     -- Continue Eliquis 5 mg twice daily for stroke prophylaxis.  She reports no missed doses or bleeding issues.      2.  Hypertension: Controlled with metoprolol, benazepril, chlorthalidone    3.  Obstructive sleep apnea: Consistent use of CPAP nightly.  We discussed the correlation between untreated sleep apnea and atrial arrhythmias.  She was encouraged to maintain compliance with CPAP therapy.  She was instructed to bring her CPAP to the hospital to use after ablation.    Telephone follow up with EP RN on 8/20/2024  Follow-up with me on 9/27/2024, 6 weeks post PVI     History of Present Illness/Subjective    HPI: Hedy Haq is a 63 year old female who comes in for EP follow up of atrial fibrillation and history and physical prior to pulmonary vein isolation ablation.  She has a history of paroxysmal atrial fibrillation, ascending aortic aneurysm, hypertension, KAREN-on CPAP, hypothyroidism.    Arrhythmia history  Dx/date: 8/2020 by MCT.  Symptom onset 2020.  Sx: Palpitations  [id number] score: 3 for age 65-74, female gender, HTN  Oral anticoagulation: 1 for age   Antiarrhythmic medications, AV linda blocking agents:  "flecainide (10/2020-present)   Procedures  DCCV: None  Ablation: Scheduled 8/16/2024 with Dr. Papi Knott.  She denies chest discomfort, palpitations, abdominal fullness/bloating or peripheral edema, shortness of breath, paroxysmal nocturnal dyspnea, orthopnea, lightheadedness, dizziness, pre-syncope, or syncope.    Cardiographics (EKG personally reviewed):  EKG done 8/12/2024 shows sinus bradycardia at 47 bpm, QRS 86 ms, QT/QTc 440/392 ms.    ELVIRA worn 8/25/2020:  1. Highly abnormal 21 days event monitor demonstrating a significantly increased burden of atrial fibrillation with overall controlled ventricular rates.   Overall AF burden was 14%.   2. Nocturnal bradycardia and pauses less than 3 seconds mainly associated with AF.  3. Symptomatic transmissions for symptoms of skipped beat associated with PACs and PVCs.    ECHO done 8/25/2020:    1.Negative pharmacological regadenoson ECG for ischemia.    2.The nuclear stress test is negative for inducible myocardial ischemia or infarction.  Below defect is felt to represent breast attenuation on perfusion diagram.    3.The left ventricular ejection fraction at stress is 59%.    4.There is no prior study for comparison.    NM stress test done 8/25/2020:    1.Negative pharmacological regadenoson ECG for ischemia.    2.The nuclear stress test is negative for inducible myocardial ischemia or infarction.  Below defect is felt to represent breast attenuation on perfusion diagram.    3.The left ventricular ejection fraction at stress is 59%.    4.There is no prior study for comparison.    I have reviewed and updated the patient's Past Medical History, Social History, Family History and Medication List.  Outside records personally reviewed.     Physical Examination  Review of Systems   Vitals: /68 (BP Location: Left arm, Patient Position: Sitting, Cuff Size: Adult Large)   Pulse (!) 47   Resp 16   Ht 1.702 m (5' 7\")   Wt 120.7 kg (266 lb)   BMI 41.66 kg/m  "   BMI= Body mass index is 41.66 kg/m .  Wt Readings from Last 3 Encounters:   08/12/24 120.7 kg (266 lb)   06/26/24 118.6 kg (261 lb 8 oz)   06/03/24 118.8 kg (262 lb)       General Appearance:   Alert, well-appearing and in no acute distress.   HEENT: Atraumatic, normocephalic.  No scleral icterus, normal conjunctivae, EOMs intact, PERRL.  Mucous membranes pink and moist.     Chest/Lungs:   Chest symmetric, spine straight.  Respirations unlabored.  Lungs are clear to auscultation.   Cardiovascular:   Regular rate and rhythm.  Normal first and second heart sounds with no murmurs, rubs, or gallops; radial and posterior tibial pulses are intact, no edema.   Abdomen:  Soft, nondistended, bowel sounds present.   Extremities: No cyanosis or clubbing.   Musculoskeletal: Moves all extremities.     Skin: Warm, dry, intact.    Neurologic: Mood and affect are appropriate.  Alert and oriented to person, place, time, and situation.     ROS: 10 point ROS neg other than the symptoms noted above in the HPI.         Medical History  Surgical History Family History Social History   Past Medical History:   Diagnosis Date    Acquired hypothyroidism 08/03/2020    Essential hypertension, benign 08/03/2020    KAREN (obstructive sleep apnea) 10/19/2020    Paroxysmal atrial fibrillation (H) 08/03/2020    Sinus bradycardia 06/05/2023    Thoracic aortic aneurysm without rupture (H24) 10/19/2020     Past Surgical History:   Procedure Laterality Date    CATARACT EXTRACTION Bilateral     RADIOFREQUENCY ABLATION, UTERINE      TUBAL LIGATION       Family History   Problem Relation Age of Onset    Hypertension Mother     Hypothyroidism Mother     Hypertension Father     Atrial fibrillation Father         Social History     Socioeconomic History    Marital status:      Spouse name: Not on file    Number of children: Not on file    Years of education: Not on file    Highest education level: Not on file   Occupational History    Not on file    Tobacco Use    Smoking status: Never    Smokeless tobacco: Never   Substance and Sexual Activity    Alcohol use: Not on file    Drug use: Never    Sexual activity: Not on file   Other Topics Concern    Not on file   Social History Narrative    Not on file     Social Determinants of Health     Financial Resource Strain: Not on file   Food Insecurity: Not on file   Transportation Needs: Not on file   Physical Activity: Not on file   Stress: Not on file   Social Connections: Not on file   Interpersonal Safety: Not on file   Housing Stability: Not on file           Medications  Allergies   Current Outpatient Medications   Medication Sig Dispense Refill    apixaban ANTICOAGULANT (ELIQUIS) 5 mg Tab tablet [APIXABAN ANTICOAGULANT (ELIQUIS) 5 MG TAB TABLET] Take 5 mg by mouth 2 (two) times a day.      benazepril (LOTENSIN) 40 MG tablet Take 1 tablet by mouth daily at 2 pm      chlorthalidone (HYGROTON) 25 MG tablet TAKE 1 TABLET(25 MG) BY MOUTH DAILY 90 tablet 1    flecainide (TAMBOCOR) 50 MG tablet TAKE 1/2 TABLET(25 MG) BY MOUTH TWICE DAILY 90 tablet 3    levothyroxine (SYNTHROID/LEVOTHROID) 112 MCG tablet TAKE 2 TABLETS BY MOUTH EVERY DAY FOR THYROID      metoprolol tartrate (LOPRESSOR) 50 MG tablet Take 1 tablet by mouth 2 times daily Take 50 mg in the morning and 25 mg in the evening      [START ON 8/13/2024] pantoprazole (PROTONIX) 40 MG EC tablet Take 1 tablet (40 mg) by mouth daily Continue for 6 weeks after ablation 45 tablet 0       Allergies   Allergen Reactions    Statins [Statins] Muscle Pain (Myalgia)     Has tried atorvastatin and simvastatin       Denies previous adverse reaction to anesthesia      Lab Results    Chemistry/lipid CBC Cardiac Enzymes/BNP/TSH/INR   Recent Labs   Lab Test 12/01/23  0922   CHOL 201*   HDL 66   *   TRIG 108     Recent Labs   Lab Test 12/01/23  0922 11/30/22  1014 11/26/21  1039   * 139* 140*     Recent Labs   Lab Test 12/01/23  0922      POTASSIUM 3.9  "  CHLORIDE 106   CO2 27   *   BUN 13.0   CR 0.77   GFRESTIMATED 85   TAY 9.7     Recent Labs   Lab Test 12/01/23 0922 11/30/22  1014 03/18/22  1143   CR 0.77 0.73 0.79     BMP and CBC drawn today, results pending   Recent Labs   Lab Test 12/01/23 0922   WBC 10.3   HGB 12.2   HCT 39.3   MCV 84        Recent Labs   Lab Test 12/01/23 0922 03/18/22  1143   HGB 12.2 13.6    No results for input(s): \"TROPONINI\" in the last 82728 hours.  No results for input(s): \"BNP\", \"NTBNPI\", \"NTBNP\" in the last 49806 hours.  Recent Labs   Lab Test 12/01/23 0922   TSH 0.61     No results for input(s): \"INR\" in the last 48537 hours.     The longitudinal plan of care for the diagnosis(es)/condition(s) as documented were addressed during this visit. Due to the added complexity in care, I will continue to support Hedy in the subsequent management and with ongoing continuity of care.                                         "

## 2024-08-12 NOTE — PATIENT INSTRUCTIONS
Hedy Haq,    It was a pleasure to see you today at the [facility].     My recommendations after this visit include:    You are scheduled for pulmonary vein isolation ablation with Dr. Turpin on 8/16/2024  Your CPAP to use following ablation    Do not take any flecainide after 8/12/2024  On 8/13/2024, start pantoprazole 40 mg daily, to continue for 6 weeks after ablation    Telephone follow up with EP RN on 8/20/2024  Follow-up with me on 9/27/2024, 6 weeks post PVI    Qian Coombs, CNP  [facility], Electrophysiology  [phone]  EP nurses [phone]

## 2024-08-15 ENCOUNTER — ANESTHESIA EVENT (OUTPATIENT)
Dept: CARDIOLOGY | Facility: HOSPITAL | Age: 64
End: 2024-08-15
Payer: COMMERCIAL

## 2024-08-16 ENCOUNTER — HOSPITAL ENCOUNTER (OUTPATIENT)
Facility: HOSPITAL | Age: 64
Discharge: HOME | End: 2024-08-16
Attending: INTERNAL MEDICINE | Admitting: INTERNAL MEDICINE
Payer: COMMERCIAL

## 2024-08-16 ENCOUNTER — ANESTHESIA (OUTPATIENT)
Dept: CARDIOLOGY | Facility: HOSPITAL | Age: 64
End: 2024-08-16
Payer: COMMERCIAL

## 2024-08-16 VITALS
WEIGHT: 266 LBS | SYSTOLIC BLOOD PRESSURE: 140 MMHG | HEART RATE: 49 BPM | TEMPERATURE: 97.9 F | HEIGHT: 67 IN | DIASTOLIC BLOOD PRESSURE: 66 MMHG | OXYGEN SATURATION: 95 % | RESPIRATION RATE: 15 BRPM | BODY MASS INDEX: 41.75 KG/M2

## 2024-08-16 DIAGNOSIS — I48.0 PAROXYSMAL ATRIAL FIBRILLATION (H): ICD-10-CM

## 2024-08-16 LAB
ACT BLD: 308 SECONDS (ref 74–150)
ACT BLD: 345 SECONDS (ref 74–150)
ACT BLD: 354 SECONDS (ref 74–150)
ATRIAL RATE - MUSE: 55 BPM
DIASTOLIC BLOOD PRESSURE - MUSE: NORMAL MMHG
INTERPRETATION ECG - MUSE: NORMAL
P AXIS - MUSE: 77 DEGREES
PR INTERVAL - MUSE: 166 MS
QRS DURATION - MUSE: 88 MS
QT - MUSE: 466 MS
QTC - MUSE: 445 MS
R AXIS - MUSE: 4 DEGREES
SYSTOLIC BLOOD PRESSURE - MUSE: NORMAL MMHG
T AXIS - MUSE: 52 DEGREES
VENTRICULAR RATE- MUSE: 55 BPM

## 2024-08-16 PROCEDURE — 93615 ESOPHAGEAL RECORDING: CPT | Mod: 26 | Performed by: INTERNAL MEDICINE

## 2024-08-16 PROCEDURE — 93615 ESOPHAGEAL RECORDING: CPT | Performed by: INTERNAL MEDICINE

## 2024-08-16 PROCEDURE — 370N000017 HC ANESTHESIA TECHNICAL FEE, PER MIN: Performed by: INTERNAL MEDICINE

## 2024-08-16 PROCEDURE — 93656 COMPRE EP EVAL ABLTJ ATR FIB: CPT | Performed by: STUDENT IN AN ORGANIZED HEALTH CARE EDUCATION/TRAINING PROGRAM

## 2024-08-16 PROCEDURE — C1887 CATHETER, GUIDING: HCPCS | Performed by: INTERNAL MEDICINE

## 2024-08-16 PROCEDURE — 93656 COMPRE EP EVAL ABLTJ ATR FIB: CPT | Performed by: ANESTHESIOLOGY

## 2024-08-16 PROCEDURE — 93656 COMPRE EP EVAL ABLTJ ATR FIB: CPT | Performed by: INTERNAL MEDICINE

## 2024-08-16 PROCEDURE — 258N000003 HC RX IP 258 OP 636: Performed by: INTERNAL MEDICINE

## 2024-08-16 PROCEDURE — C1730 CATH, EP, 19 OR FEW ELECT: HCPCS | Performed by: INTERNAL MEDICINE

## 2024-08-16 PROCEDURE — 93005 ELECTROCARDIOGRAM TRACING: CPT

## 2024-08-16 PROCEDURE — 85347 COAGULATION TIME ACTIVATED: CPT | Mod: 91

## 2024-08-16 PROCEDURE — 93623 PRGRMD STIMJ&PACG IV RX NFS: CPT | Mod: 26 | Performed by: INTERNAL MEDICINE

## 2024-08-16 PROCEDURE — 272N000001 HC OR GENERAL SUPPLY STERILE: Performed by: INTERNAL MEDICINE

## 2024-08-16 PROCEDURE — 250N000011 HC RX IP 250 OP 636: Performed by: INTERNAL MEDICINE

## 2024-08-16 PROCEDURE — 93623 PRGRMD STIMJ&PACG IV RX NFS: CPT | Performed by: INTERNAL MEDICINE

## 2024-08-16 PROCEDURE — 250N000009 HC RX 250: Performed by: INTERNAL MEDICINE

## 2024-08-16 PROCEDURE — 999N000054 HC STATISTIC EKG NON-CHARGEABLE

## 2024-08-16 PROCEDURE — C1769 GUIDE WIRE: HCPCS | Performed by: INTERNAL MEDICINE

## 2024-08-16 PROCEDURE — C1759 CATH, INTRA ECHOCARDIOGRAPHY: HCPCS | Performed by: INTERNAL MEDICINE

## 2024-08-16 PROCEDURE — 250N000011 HC RX IP 250 OP 636: Performed by: STUDENT IN AN ORGANIZED HEALTH CARE EDUCATION/TRAINING PROGRAM

## 2024-08-16 PROCEDURE — C1732 CATH, EP, DIAG/ABL, 3D/VECT: HCPCS | Performed by: INTERNAL MEDICINE

## 2024-08-16 PROCEDURE — C1733 CATH, EP, OTHR THAN COOL-TIP: HCPCS | Performed by: INTERNAL MEDICINE

## 2024-08-16 PROCEDURE — 93010 ELECTROCARDIOGRAM REPORT: CPT | Performed by: STUDENT IN AN ORGANIZED HEALTH CARE EDUCATION/TRAINING PROGRAM

## 2024-08-16 PROCEDURE — 710N000010 HC RECOVERY PHASE 1, LEVEL 2, PER MIN

## 2024-08-16 PROCEDURE — C1894 INTRO/SHEATH, NON-LASER: HCPCS | Performed by: INTERNAL MEDICINE

## 2024-08-16 PROCEDURE — 250N000009 HC RX 250: Performed by: STUDENT IN AN ORGANIZED HEALTH CARE EDUCATION/TRAINING PROGRAM

## 2024-08-16 PROCEDURE — 258N000003 HC RX IP 258 OP 636: Performed by: STUDENT IN AN ORGANIZED HEALTH CARE EDUCATION/TRAINING PROGRAM

## 2024-08-16 PROCEDURE — C1766 INTRO/SHEATH,STRBLE,NON-PEEL: HCPCS | Performed by: INTERNAL MEDICINE

## 2024-08-16 RX ORDER — ADENOSINE 3 MG/ML
INJECTION, SOLUTION INTRAVENOUS
Status: DISCONTINUED | OUTPATIENT
Start: 2024-08-16 | End: 2024-08-16 | Stop reason: HOSPADM

## 2024-08-16 RX ORDER — LIDOCAINE 40 MG/G
CREAM TOPICAL
Status: DISCONTINUED | OUTPATIENT
Start: 2024-08-16 | End: 2024-08-16 | Stop reason: HOSPADM

## 2024-08-16 RX ORDER — ACETAMINOPHEN 325 MG/1
650 TABLET ORAL EVERY 4 HOURS PRN
Status: DISCONTINUED | OUTPATIENT
Start: 2024-08-16 | End: 2024-08-16 | Stop reason: HOSPADM

## 2024-08-16 RX ORDER — PROPOFOL 10 MG/ML
INJECTION, EMULSION INTRAVENOUS PRN
Status: DISCONTINUED | OUTPATIENT
Start: 2024-08-16 | End: 2024-08-16

## 2024-08-16 RX ORDER — SODIUM CHLORIDE, SODIUM LACTATE, POTASSIUM CHLORIDE, CALCIUM CHLORIDE 600; 310; 30; 20 MG/100ML; MG/100ML; MG/100ML; MG/100ML
INJECTION, SOLUTION INTRAVENOUS CONTINUOUS PRN
Status: DISCONTINUED | OUTPATIENT
Start: 2024-08-16 | End: 2024-08-16

## 2024-08-16 RX ORDER — ONDANSETRON 4 MG/1
4 TABLET, ORALLY DISINTEGRATING ORAL EVERY 6 HOURS PRN
Status: DISCONTINUED | OUTPATIENT
Start: 2024-08-16 | End: 2024-08-16 | Stop reason: HOSPADM

## 2024-08-16 RX ORDER — SODIUM CHLORIDE 9 MG/ML
100 INJECTION, SOLUTION INTRAVENOUS CONTINUOUS
Status: DISCONTINUED | OUTPATIENT
Start: 2024-08-16 | End: 2024-08-16 | Stop reason: HOSPADM

## 2024-08-16 RX ORDER — ONDANSETRON 2 MG/ML
4 INJECTION INTRAMUSCULAR; INTRAVENOUS EVERY 6 HOURS PRN
Status: DISCONTINUED | OUTPATIENT
Start: 2024-08-16 | End: 2024-08-16 | Stop reason: HOSPADM

## 2024-08-16 RX ORDER — IBUPROFEN 600 MG/1
600 TABLET, FILM COATED ORAL EVERY 6 HOURS PRN
Status: DISCONTINUED | OUTPATIENT
Start: 2024-08-16 | End: 2024-08-16 | Stop reason: HOSPADM

## 2024-08-16 RX ORDER — ONDANSETRON 2 MG/ML
INJECTION INTRAMUSCULAR; INTRAVENOUS PRN
Status: DISCONTINUED | OUTPATIENT
Start: 2024-08-16 | End: 2024-08-16

## 2024-08-16 RX ORDER — LIDOCAINE HYDROCHLORIDE AND EPINEPHRINE 10; 10 MG/ML; UG/ML
INJECTION, SOLUTION INFILTRATION; PERINEURAL
Status: DISCONTINUED | OUTPATIENT
Start: 2024-08-16 | End: 2024-08-16 | Stop reason: HOSPADM

## 2024-08-16 RX ORDER — BUPIVACAINE HYDROCHLORIDE 2.5 MG/ML
INJECTION, SOLUTION EPIDURAL; INFILTRATION; INTRACAUDAL
Status: DISCONTINUED | OUTPATIENT
Start: 2024-08-16 | End: 2024-08-16 | Stop reason: HOSPADM

## 2024-08-16 RX ORDER — FENTANYL CITRATE 50 UG/ML
INJECTION, SOLUTION INTRAMUSCULAR; INTRAVENOUS PRN
Status: DISCONTINUED | OUTPATIENT
Start: 2024-08-16 | End: 2024-08-16

## 2024-08-16 RX ORDER — DEXAMETHASONE SODIUM PHOSPHATE 4 MG/ML
INJECTION, SOLUTION INTRA-ARTICULAR; INTRALESIONAL; INTRAMUSCULAR; INTRAVENOUS; SOFT TISSUE PRN
Status: DISCONTINUED | OUTPATIENT
Start: 2024-08-16 | End: 2024-08-16

## 2024-08-16 RX ORDER — HEPARIN SODIUM 1000 [USP'U]/ML
INJECTION, SOLUTION INTRAVENOUS; SUBCUTANEOUS
Status: DISCONTINUED | OUTPATIENT
Start: 2024-08-16 | End: 2024-08-16 | Stop reason: HOSPADM

## 2024-08-16 RX ORDER — KETAMINE HYDROCHLORIDE 10 MG/ML
INJECTION INTRAMUSCULAR; INTRAVENOUS PRN
Status: DISCONTINUED | OUTPATIENT
Start: 2024-08-16 | End: 2024-08-16

## 2024-08-16 RX ORDER — PROTAMINE SULFATE 10 MG/ML
INJECTION, SOLUTION INTRAVENOUS PRN
Status: DISCONTINUED | OUTPATIENT
Start: 2024-08-16 | End: 2024-08-16

## 2024-08-16 RX ORDER — HEPARIN SODIUM 10000 [USP'U]/100ML
INJECTION, SOLUTION INTRAVENOUS CONTINUOUS PRN
Status: DISCONTINUED | OUTPATIENT
Start: 2024-08-16 | End: 2024-08-16 | Stop reason: HOSPADM

## 2024-08-16 RX ORDER — SODIUM CHLORIDE, SODIUM LACTATE, POTASSIUM CHLORIDE, CALCIUM CHLORIDE 600; 310; 30; 20 MG/100ML; MG/100ML; MG/100ML; MG/100ML
INJECTION, SOLUTION INTRAVENOUS CONTINUOUS
Status: DISCONTINUED | OUTPATIENT
Start: 2024-08-16 | End: 2024-08-16 | Stop reason: HOSPADM

## 2024-08-16 RX ORDER — SODIUM CHLORIDE 9 MG/ML
INJECTION, SOLUTION INTRAVENOUS CONTINUOUS PRN
Status: DISCONTINUED | OUTPATIENT
Start: 2024-08-16 | End: 2024-08-16

## 2024-08-16 RX ORDER — LIDOCAINE HYDROCHLORIDE 10 MG/ML
INJECTION, SOLUTION INFILTRATION; PERINEURAL PRN
Status: DISCONTINUED | OUTPATIENT
Start: 2024-08-16 | End: 2024-08-16

## 2024-08-16 RX ADMIN — FENTANYL CITRATE 25 MCG: 50 INJECTION INTRAMUSCULAR; INTRAVENOUS at 07:22

## 2024-08-16 RX ADMIN — FENTANYL CITRATE 25 MCG: 50 INJECTION INTRAMUSCULAR; INTRAVENOUS at 07:25

## 2024-08-16 RX ADMIN — Medication 200 MG: at 07:12

## 2024-08-16 RX ADMIN — LIDOCAINE HYDROCHLORIDE 5 MG: 10 INJECTION, SOLUTION INFILTRATION; PERINEURAL at 07:12

## 2024-08-16 RX ADMIN — SODIUM CHLORIDE 100 ML/HR: 9 INJECTION, SOLUTION INTRAVENOUS at 06:28

## 2024-08-16 RX ADMIN — ROCURONIUM BROMIDE 40 MG: 50 INJECTION, SOLUTION INTRAVENOUS at 07:37

## 2024-08-16 RX ADMIN — PROPOFOL 150 MG: 10 INJECTION, EMULSION INTRAVENOUS at 07:12

## 2024-08-16 RX ADMIN — DEXAMETHASONE SODIUM PHOSPHATE 10 MG: 4 INJECTION, SOLUTION INTRA-ARTICULAR; INTRALESIONAL; INTRAMUSCULAR; INTRAVENOUS; SOFT TISSUE at 07:24

## 2024-08-16 RX ADMIN — Medication 200 MG: at 08:32

## 2024-08-16 RX ADMIN — KETAMINE HYDROCHLORIDE 50 MG: 10 INJECTION INTRAMUSCULAR; INTRAVENOUS at 07:12

## 2024-08-16 RX ADMIN — PHENYLEPHRINE HYDROCHLORIDE 100 MCG: 10 INJECTION INTRAVENOUS at 08:33

## 2024-08-16 RX ADMIN — SODIUM CHLORIDE: 9 INJECTION, SOLUTION INTRAVENOUS at 06:55

## 2024-08-16 RX ADMIN — FENTANYL CITRATE 25 MCG: 50 INJECTION INTRAMUSCULAR; INTRAVENOUS at 07:21

## 2024-08-16 RX ADMIN — PROTAMINE SULFATE 50 MG: 10 INJECTION, SOLUTION INTRAVENOUS at 08:29

## 2024-08-16 RX ADMIN — ONDANSETRON 4 MG: 2 INJECTION INTRAMUSCULAR; INTRAVENOUS at 08:29

## 2024-08-16 RX ADMIN — FENTANYL CITRATE 25 MCG: 50 INJECTION INTRAMUSCULAR; INTRAVENOUS at 07:27

## 2024-08-16 RX ADMIN — PROPOFOL 50 MG: 10 INJECTION, EMULSION INTRAVENOUS at 07:21

## 2024-08-16 RX ADMIN — PHENYLEPHRINE HYDROCHLORIDE 50 MCG: 10 INJECTION INTRAVENOUS at 07:27

## 2024-08-16 ASSESSMENT — ACTIVITIES OF DAILY LIVING (ADL)
ADLS_ACUITY_SCORE: 35

## 2024-08-16 ASSESSMENT — ENCOUNTER SYMPTOMS: DYSRHYTHMIAS: 1

## 2024-08-16 NOTE — ANESTHESIA PREPROCEDURE EVALUATION
Anesthesia Pre-Procedure Evaluation    Patient: Hedy Haq   MRN: [id number] : 1960        Procedure : Procedure(s):  Ablation Atrial Fibrillation          Past Medical History:   Diagnosis Date    Acquired hypothyroidism 2020    Essential hypertension, benign 2020    KAREN (obstructive sleep apnea) 10/19/2020    Paroxysmal atrial fibrillation (H) 2020    Sinus bradycardia 2023    Thoracic aortic aneurysm without rupture (H24) 10/19/2020      Past Surgical History:   Procedure Laterality Date    CATARACT EXTRACTION Bilateral     RADIOFREQUENCY ABLATION, UTERINE      TUBAL LIGATION        Allergies   Allergen Reactions    Statins [Statins] Muscle Pain (Myalgia)     Has tried atorvastatin and simvastatin       Social History     Tobacco Use    Smoking status: Never    Smokeless tobacco: Never   Substance Use Topics    Alcohol use: Not Currently      Wt Readings from Last 1 Encounters:   24 120.7 kg (266 lb)        Anesthesia Evaluation   Pt has had prior anesthetic.     History of anesthetic complications       ROS/MED HX  ENT/Pulmonary:     (+) sleep apnea,                                       Neurologic:       Cardiovascular:     (+)  hypertension- -   -  - -                        dysrhythmias,              METS/Exercise Tolerance:     Hematologic:       Musculoskeletal:       GI/Hepatic:       Renal/Genitourinary:       Endo:     (+)          thyroid problem,     Obesity,       Psychiatric/Substance Use:       Infectious Disease:       Malignancy:       Other:            Physical Exam    Airway        Mallampati: II    Neck ROM: full     Respiratory Devices and Support         Dental       (+) Minor Abnormalities - some fillings, tiny chips      Cardiovascular          Rhythm and rate: irregular     Pulmonary   pulmonary exam normal                OUTSIDE LABS:  CBC:   Lab Results   Component Value Date    WBC 7.3 2024    WBC 10.3 2023    HGB 11.2 (L)  "08/12/2024    HGB 12.2 12/01/2023    HCT 37.4 08/12/2024    HCT 39.3 12/01/2023     08/12/2024     12/01/2023     BMP:   Lab Results   Component Value Date     (L) 08/12/2024     12/01/2023    POTASSIUM 4.4 08/12/2024    POTASSIUM 3.9 12/01/2023    CHLORIDE 102 08/12/2024    CHLORIDE 106 12/01/2023    CO2 31 (H) 08/12/2024    CO2 27 12/01/2023    BUN 15.1 08/12/2024    BUN 13.0 12/01/2023    CR 0.84 08/12/2024    CR 0.77 12/01/2023     (H) 08/12/2024     (H) 12/01/2023     COAGS: No results found for: \"PTT\", \"INR\", \"FIBR\"  POC: No results found for: \"BGM\", \"HCG\", \"HCGS\"  HEPATIC:   Lab Results   Component Value Date    ALBUMIN 4.2 12/01/2023    PROTTOTAL 7.2 12/01/2023    ALT 19 12/01/2023    AST 16 12/01/2023    ALKPHOS 103 12/01/2023    BILITOTAL 0.4 12/01/2023     OTHER:   Lab Results   Component Value Date    TAY 9.6 08/12/2024    MAG 1.7 (L) 03/18/2022    TSH 0.61 12/01/2023       Anesthesia Plan    ASA Status:  3       Anesthesia Type: General.              Consents    Anesthesia Plan(s) and associated risks, benefits, and realistic alternatives discussed. Questions answered and patient/representative(s) expressed understanding.     - Discussed: Risks, Benefits and Alternatives for the PROCEDURE were discussed     - Discussed with:  Patient            Postoperative Care    Pain management: Multi-modal analgesia.   PONV prophylaxis: Ondansetron (or other 5HT-3), Dexamethasone or Solumedrol     Comments:               Tonya Rubin MD    I have reviewed the pertinent notes and labs in the chart from the past 30 days and (re)examined the patient.  Any updates or changes from those notes are reflected in this note.     # Hyponatremia: Lowest Na = 133 mmol/L in last 30 days, will monitor as appropriate        # Drug Induced Coagulation Defect: home medication list includes an anticoagulant medication   # Severe Obesity: Estimated body mass index is 41.66 kg/m  as calculated " "from the following:    Height as of this encounter: 1.702 m (5' 7\").    Weight as of this encounter: 120.7 kg (266 lb).      "

## 2024-08-16 NOTE — DISCHARGE INSTRUCTIONS
[facility]  Cardiac Electrophysiology  [address]  [place] [address]   Office: [phone]  Fax: [phone]       Cardiac Electrophysiology - Post Ablation Discharge Instructions      PROCEDURE   Atrial fibrillation ablation         MEDICATION INSTRUCTIONS   Continue taking your prior to procedure medications, including flecainide for now.  Continue taking your blood thinner apixaban (Eliquis).          DISCHARGE INSTRUCTIONS   Medications   Take your medications as prescribed.   It is important for you to continue your blood thinner without interruption, unless your electrophysiologist instructs you otherwise.     General instructions   Have an adult stay with you until tomorrow.   You may resume your normal diet.     You may shower tomorrow.  Do not take a bath, or use a hot tub or pool for at least 1 week.    Activity recommendations   Do not drive for 3 days.   Avoid stooping or squatting more than 90 degrees at the hips for 7 days.   Avoid repetitive motions such as loading , vacuuming, raking or shoveling for 7 days.   Avoid heavy lifting (greater than 25lbs) for 7 days.       Groin care instructions   For the first 24 hours after your ablation, check the groin access sites every 1-2 hours while awake.   You may keep a bandaid over the puncture sites for 1 or 2 days post-procedure and thereafter may keep these sites uncovered.  Change the bandaid daily.  If there is minor oozing, apply another bandaid and remove it after 12 hours.   For 2 days, when you cough, sneeze, laugh or move your bowels, hold your hand over the puncture sites and press firmly.   Do not scrub the groin access sites.   Do not use lotion or powder near the groin access sites.       Arrhythmias following ablation  Recurrent atrial fibrillation and palpitations are common within the first 3 months post ablation while your heart recovers from the procedure.  These are usually more  frequent in the first few weeks following ablation and should occur less frequently over time.  Please contact us if you are having frequent or long lasting atrial fibrillation episodes following ablation.    Common findings after ablation  Bruising and a dime or pea size lump at the access sites is common.  If you notice increased swelling, external bleeding, or have other concerns regarding your groin access sites please call your electrophysiology team's office, and if after hours consider emergency department evaluation.   Soreness and mild pain at your groin sites is normal, to help relieve this pain you can apply ice/cold packs to the sites for 20min 3-4 times per day.   Pleuritic chest discomfort (chest pain worse with taking deep breaths, worse with laying flat on your back) can occur after ablation, usually coming about within the first 24-72hrs post ablation.  If this occurs and is severe enough to be troublesome to you, please call us and consider starting a course of ibuprofen 400mg three times daily for 5 to 7 days.     Things to watch for   As with any type of procedure, please be more attentive to unusual symptoms post ablation (eg. fever, neurologic changes, pain with swallowing, loss of consciousness, etc) - we recommend ER evaluation for any such symptoms in the first few weeks post procedure.       Contact the EP Nurse line with any of the following.  Contact the cardiology on-call number after business hours.    Consider ER evaluation for severe symptoms.   Groin pain, swelling, or growing hard lump around the puncture sites.   Groin redness, tenderness, swelling, or drainage (blood or pus).   Neurological changes (for example: leg, arm or face weakness or numbness, difficulties with speech or word finding, problems walking or with your balance, vision changes).   Any numbness, coolness or changes in color in your extremities.  Sudden onset severe abdominal or back pain.  Moderate or severe chest  pain not relieved by Tylenol or Ibuprofen, particularly after the first 48 hours.   Shortness of breath.   Chills or fever greater than 100 F.   Difficulty swallowing food or liquids.  Coughing up blood.   Blood in your stool.  Nausea and vomiting.  Difficulty urinating.  Recurrent atrial fibrillation associated with prolonged rapid heart rates (for example, heart rates over 140bpm for greater than 4 hours) or associated with additional concerning symptoms (for example, chest pain, lightheadedness, loss of consciousness, sweating).     If you are being evaluated at an emergency department, please tell your ER doctor that you have recently underwent an atrial fibrillation ablation and ask the ER to contact our office.  Many special considerations apply following ablation - these may be overlooked during an ER evaluation.      Our office will have a follow-up visit scheduled for you in approximately 6 weeks.  Please do not hesitate to call us before that time should issues arise.         [facility]      To reach the EP nurses working with Dr. Turpin:   [phone]        To reach the general [facility] Care Clinic line or after hours service:   [phone]   If you are calling after hours, please listen to the entire voicemail,    a live  will answer at the end of the message.

## 2024-08-16 NOTE — PROGRESS NOTES
Patient is kept comfortable during post-procedure stay. VSS. Denies pain. Right femoral access site remains dry & free from signs of bleeding. Tolerated  fluids. Ambulated without issues. Appointments made & included in AVS. Dr. Turpin was able to speak with patient post procedure. Post-op instructions reviewed and packet given to patient & spouse. Able to ask questions. Verbalized no concerns. Belongings returned. Discharged in stable condition.

## 2024-08-16 NOTE — ANESTHESIA CARE TRANSFER NOTE
Patient: Hedy Haq    Procedure: Procedure(s):  Ablation Atrial Fibrillation       Diagnosis: Parozysmal Atrial Fibrillation  Diagnosis Additional Information: No value filed.    Anesthesia Type:   General     Note:    Oropharynx: oropharynx clear of all foreign objects and spontaneously breathing  Level of Consciousness: awake  Oxygen Supplementation: face mask  Level of Supplemental Oxygen (L/min / FiO2): 8  Independent Airway: airway patency satisfactory and stable  Dentition: dentition unchanged  Vital Signs Stable: post-procedure vital signs reviewed and stable  Report to RN Given: handoff report given  Patient transferred to: Cardiac Special Care          Vitals:  Vitals Value Taken Time   /59 08/16/24 0845   Temp 36.6  C (97.9  F) 08/16/24 0845   Pulse 53 08/16/24 0847   Resp 19 08/16/24 0847   SpO2 98 % 08/16/24 0847   Vitals shown include unfiled device data.    Electronically Signed By: SERGEY Carnes CRNA  August 16, 2024  8:49 AM

## 2024-08-16 NOTE — ANESTHESIA POSTPROCEDURE EVALUATION
Patient: Hedy Haq    Procedure: Procedure(s):  Ablation Atrial Fibrillation       Anesthesia Type:  General    Note:  Disposition: Outpatient   Postop Pain Control: Uneventful            Sign Out: Well controlled pain   PONV: No   Neuro/Psych: Uneventful            Sign Out: Acceptable/Baseline neuro status   Airway/Respiratory: Uneventful            Sign Out: Acceptable/Baseline resp. status   CV/Hemodynamics: Uneventful            Sign Out: Acceptable CV status; No obvious hypovolemia; No obvious fluid overload   Other NRE: NONE   DID A NON-ROUTINE EVENT OCCUR? No           Last vitals:  Vitals Value Taken Time   /66 08/16/24 1245   Temp 36.6  C (97.9  F) 08/16/24 0845   Pulse 52 08/16/24 1257   Resp 22 08/16/24 1257   SpO2 98 % 08/16/24 1257   Vitals shown include unfiled device data.    Electronically Signed By: Tonya Rubin MD  August 16, 2024  1:02 PM

## 2024-08-16 NOTE — INTERVAL H&P NOTE
"I have reviewed the surgical (or preoperative) H&P that is linked to this encounter, and examined the patient. There are no significant changes    Clinical Conditions Present on Arrival:  Clinically Significant Risk Factors Present on Admission         # Hyponatremia: Lowest Na = 133 mmol/L in last 30 days, will monitor as appropriate        # Drug Induced Coagulation Defect: home medication list includes an anticoagulant medication       # Severe Obesity: Estimated body mass index is 41.66 kg/m  as calculated from the following:    Height as of this encounter: 1.702 m (5' 7\").    Weight as of this encounter: 120.7 kg (266 lb).       "

## 2024-08-16 NOTE — Clinical Note
[employer] Med system 12 lead EKG, hemodynamics 5 lead, pulse oximetery, NIBP, [employer] hands off defibrillator/external pacer, with 3 monitoring leads to patient. Baseline assessment done.

## 2024-08-20 ENCOUNTER — VIRTUAL VISIT (OUTPATIENT)
Dept: CARDIOLOGY | Facility: CLINIC | Age: 64
End: 2024-08-20
Payer: COMMERCIAL

## 2024-08-20 DIAGNOSIS — I48.0 PAROXYSMAL ATRIAL FIBRILLATION (H): Primary | ICD-10-CM

## 2024-08-20 PROCEDURE — 99207 PR NO CHARGE NURSE ONLY: CPT | Mod: 93

## 2024-08-20 NOTE — PROGRESS NOTES
Post PVI Procedural Follow Up Call    Pt is s/p PVI from 8/16/24 with Dr Turpin  PC was placed to pt, spoke to pt    General Assessment:     Weight: Pt reports weight is at baseline compared to pre procedural weight    Pain: Pt denies generalized or localized pain abnormal to healing s/p     /GI: Pt denies difficulty swallowing, denies constipation, denies urinary retention/difficulty, reports no s/s of infection, report normal appetite, and reports staying hydrated.    Neurological: Pt Denies any neurological changes, or s/s of CVA    Respiratory: Pt denies SOB, denies difficulty breathing, denies throat pain, denies changes/abnormal sputum, and denies any further symptoms abnormal to normal healing process s/p PVI.    Activity: Pt is tolerating advancement in activity while following physical restrictions, staying well hydrated, and gradually working into baseline activity.     Rhythm Assessment:   Pt denies palpitations, denies irregularities in HR or rhythm, and denies symptoms or sustained AF episodes.    Procedure Site Assessment:   Pts some bruising around sites without significant change from hospital discharge and normal to PVI recovery    Anticoagulation/Medication:  Pt remain on Eliquis without interruption  Per guidelines by Dr Turpin no ASA needed upon discharge    Education completed with pt at this visit:  Reviewed normal post-op PVI healing process, when to contact EP-RN/EP-MD, contact information was given to the pt for further concerns or questions, and pt verbalized understanding    Follow up  Pts AVS was printed and mailed to pt by scheduling team and pt will be seen by EP NP at 6 wks, monitor will be ordered at this follow-up if indicated as well as 3mo follow-up with either EP SHELLI or RAQUEL MTZ    8/20/2024 9:54 AM  Charlotte Ansari RN

## 2024-08-20 NOTE — PATIENT INSTRUCTIONS
Instructions Following your Ablation Procedure    Your anticoagulation medication Eliquis:  It is important to remain on your anticoagulation medication uninterrupted after your ablation to reduce your risk of a stroke or heart attack, do not stop this medication  Please contact me if you have any questions regarding your anticoagulation medication    Groin care instructions  Keep the site clean and dry, do not place a bandage over the site. If there is minor oozing, apply another bandaid and remove it after 12 hours.  Mild bruising at the access sites is normal. If you notice increased swelling, external bleeding, or have other concerns regarding your access sites please consider emergency department evaluation for significant changes and call your electrophysiology team's office.  You may experience mild discomfort at your groin sites, applying ice packs 20min 3-4 times a day can help alleviate this discomfort.    Activity recommendations  You can resume driving.  Avoid stooping or squatting more than 90 degrees at the hips, repetitive motions such as loading , vacuuming, raking or shoveling, and heavy lifting (greater than 25lbs) for 1 week  Increase your activity gradually over the next 5-10 days, working back to your normal daily activity/routine.    Post ablation instructions  Stay well hydrated, and increase your fluid intake during this recovery period  High protein foods aide in your bodies healing process  You may have some irregular heartbeats and/or atrial fibrillation following your ablation which is normal to recovery, these episodes should occur less frequently over time.   Recurrent atrial fibrillation can occur within the first 3 months post ablation while your heart recovers from the procedure. Please call the electrophysiology team's office if you have an episode lasting greater than 4 hours, or if you notice the episodes are increasing in frequency or duration  Pleuritic chest  discomfort (chest pain worse with taking deep breaths, worse with laying flat on your back) can occur after ablation, usually coming about within the first 24-48hrs post ablation. If this occurs and is severe enough to be troublesome to you, please call us and consider starting a course of ibuprofen 400mg three times daily for 5 to 7 days    Things to watch for  As with any type of procedure, please be more attentive to unusual symptoms post ablation (eg. fever, neurologic changes, pain with swallowing, loss of consciousness, etc) - we recommend ER evaluation for any such symptoms in the first few weeks post procedure.    Consider ER evaluation for the following:  Severe chest pain not relieved by Tylenol or Ibuprofen  You have chills or a fever greater than 101 F (38 C)  Neurologic changes (eg. leg, arm or face weakness or numbness, difficulties with speech or word finding, problems  walking or with your balance, vision changes)  Severe difficulty swallowing and/or you are coughing up blood  Shortness of breath  Increased groin pain or a large or growing hard lump around the site  Groin is red, swollen, hot or tender  Blood or fluid is draining from the groin site  Any numbness, coolness or changes in color in your extremities  Groin pain not relieved by Tylenol or Advil  Recurrent atrial fibrillation associated with sustained rapid heart rates or associated with additional concerning  symptoms.    Your follow up appointments are as follows:  You will be seen by the electrophysiologist nurse practitioner at 6 weeks after your ablation  At your 6 week appointment, a 3 month follow-up appointment will be arranged with either the Nurse Practitioner or the Electrophysiology provider     Sincerely,  Charlotte Ansari RN [phone]    After hours please contact the on call service at # [phone]

## 2024-09-27 ENCOUNTER — OFFICE VISIT (OUTPATIENT)
Dept: CARDIOLOGY | Facility: CLINIC | Age: 64
End: 2024-09-27
Payer: COMMERCIAL

## 2024-09-27 VITALS
HEIGHT: 67 IN | WEIGHT: 268 LBS | RESPIRATION RATE: 16 BRPM | SYSTOLIC BLOOD PRESSURE: 144 MMHG | HEART RATE: 48 BPM | BODY MASS INDEX: 42.06 KG/M2 | DIASTOLIC BLOOD PRESSURE: 80 MMHG

## 2024-09-27 DIAGNOSIS — I48.0 PAROXYSMAL ATRIAL FIBRILLATION (H): Primary | ICD-10-CM

## 2024-09-27 DIAGNOSIS — Z98.890 S/P ABLATION OF ATRIAL FIBRILLATION: ICD-10-CM

## 2024-09-27 DIAGNOSIS — I10 ESSENTIAL HYPERTENSION, BENIGN: ICD-10-CM

## 2024-09-27 DIAGNOSIS — Z86.79 S/P ABLATION OF ATRIAL FIBRILLATION: ICD-10-CM

## 2024-09-27 DIAGNOSIS — R00.1 SINUS BRADYCARDIA: ICD-10-CM

## 2024-09-27 DIAGNOSIS — G47.33 OSA (OBSTRUCTIVE SLEEP APNEA): ICD-10-CM

## 2024-09-27 PROCEDURE — G2211 COMPLEX E/M VISIT ADD ON: HCPCS | Performed by: NURSE PRACTITIONER

## 2024-09-27 PROCEDURE — 99214 OFFICE O/P EST MOD 30 MIN: CPT | Performed by: NURSE PRACTITIONER

## 2024-09-27 NOTE — PROGRESS NOTES
HEART CARE ELECTROPHYSIOLOGY NOTE      [facility]  [phone]      Assessment/Recommendations   Assessment/Plan:  1. Stage 3A/paroxysmal atrial fibrillation: No symptomatology or evidence of A-fib, but she does have an occasional rhythm irregularity.  She continues to have tendency for sinus bradycardia associated with mild symptoms when heart rates <60 bpm.  She has had an uneventful recovery from ablation with no emergency department or unscheduled clinic visits.  Reviewed avoidance of possible triggers  -- Discontinue flecainide  -- Decrease metoprolol tartrate to 25 mg twice daily  -- Utilize smart watch ECG for rhythm monitoring and documentation of symptoms    She has a [id number] score of 3 for age 65-74, female gender, HTN.  HAS-BLED score of 1 for age.   Briefly discussed alternative of left atrial appendage closure, but she would prefer to remain on DOAC.  -- Continue Eliquis 5 mg twice daily for stroke prophylaxis.  She reports no missed doses or bleeding issues.      2.  Hypertension: Controlled with metoprolol, benazepril, chlorthalidone.  Will attempt to wean metoprolol due to sinus bradycardia.    3.  Obstructive sleep apnea: Consistent use of CPAP nightly.  We discussed the correlation between untreated sleep apnea and atrial arrhythmias.  She was encouraged to maintain compliance with CPAP therapy.     Follow-up 3 months post PVI     History of Present Illness/Subjective    HPI: Hedy Haq is a 63 year old female who comes in for EP follow up of atrial fibrillation and history and physical prior to pulmonary vein isolation ablation.  She has a history of paroxysmal atrial fibrillation, ascending aortic aneurysm, hypertension, KAREN-on CPAP, hypothyroidism.    Arrhythmia history  Dx/date: 8/2020 by MCT.  Symptom onset 2020.  Sx: Palpitations  [id number] score: 3 for age 65-74, female gender, HTN  Oral anticoagulation: 1 for age   Antiarrhythmic medications, AV linda  blocking agents: flecainide (10/2020-present)   Procedures  DCCV: None  Ablation: 8/16/2024 by Dr. Turpin (RF PVI)    Hedy states she feels well.  She has not had any A-fib, but reports an occasional irregularity.  She is aware of her heart rate being low with some activity intolerance and feels better when it is 60 bpm or higher.  She reports an uneventful recovery from ablation with no groin site issues, significant heartburn, difficulty swallowing, or neurologic changes.  She denies chest discomfort, palpitations, abdominal fullness/bloating or peripheral edema, shortness of breath, paroxysmal nocturnal dyspnea, orthopnea, lightheadedness, dizziness, pre-syncope, or syncope.    Cardiographics (EKG personally reviewed):  EKG done 8/16/2024 shows sinus bradycardia 55 bpm, QRS 88 ms, QT/QTc 466/445 ms  EKG done 8/12/2024 shows sinus bradycardia at 47 bpm, QRS 86 ms, QT/QTc 440/392 ms.    ELVIRA worn 8/25/2020:  1. Highly abnormal 21 days event monitor demonstrating a significantly increased burden of atrial fibrillation with overall controlled ventricular rates.   Overall AF burden was 14%.   2. Nocturnal bradycardia and pauses less than 3 seconds mainly associated with AF.  3. Symptomatic transmissions for symptoms of skipped beat associated with PACs and PVCs.    ECHO done 8/25/2020:    1.Negative pharmacological regadenoson ECG for ischemia.    2.The nuclear stress test is negative for inducible myocardial ischemia or infarction.  Below defect is felt to represent breast attenuation on perfusion diagram.    3.The left ventricular ejection fraction at stress is 59%.    4.There is no prior study for comparison.    NM stress test done 8/25/2020:    1.Negative pharmacological regadenoson ECG for ischemia.    2.The nuclear stress test is negative for inducible myocardial ischemia or infarction.  Below defect is felt to represent breast attenuation on perfusion diagram.    3.The left ventricular ejection fraction at  "stress is 59%.    4.There is no prior study for comparison.    I have reviewed and updated the patient's Past Medical History, Social History, Family History and Medication List.  Outside records personally reviewed.     Physical Examination  Review of Systems   Vitals: BP (!) 144/80 (BP Location: Right arm, Patient Position: Sitting, Cuff Size: Adult Large)   Pulse (!) 48   Resp 16   Ht 1.702 m (5' 7\")   Wt 121.6 kg (268 lb)   BMI 41.97 kg/m    BMI= Body mass index is 41.97 kg/m .  Wt Readings from Last 3 Encounters:   09/27/24 121.6 kg (268 lb)   08/16/24 120.7 kg (266 lb)   08/12/24 120.7 kg (266 lb)       General Appearance:   Alert, well-appearing and in no acute distress.   HEENT: Atraumatic, normocephalic.  No scleral icterus, normal conjunctivae, EOMs intact, PERRL.  Mucous membranes pink and moist.     Chest/Lungs:   Chest symmetric, spine straight.  Respirations unlabored.  Lungs are clear to auscultation.   Cardiovascular:   Regular rate and rhythm.  Normal first and second heart sounds with no murmurs, rubs, or gallops; radial and posterior tibial pulses are intact, no edema.   Abdomen:  Soft, nondistended.   Extremities: No cyanosis or clubbing.   Musculoskeletal: Moves all extremities.     Skin: Warm, dry, intact.    Neurologic: Mood and affect are appropriate.  Alert and oriented to person, place, time, and situation.     ROS: 10 point ROS neg other than the symptoms noted above in the HPI.         Medical History  Surgical History Family History Social History   Past Medical History:   Diagnosis Date    Acquired hypothyroidism 08/03/2020    Essential hypertension, benign 08/03/2020    KAREN (obstructive sleep apnea) 10/19/2020    Paroxysmal atrial fibrillation (H) 08/03/2020    Sinus bradycardia 06/05/2023    Thoracic aortic aneurysm without rupture (H) 10/19/2020     Past Surgical History:   Procedure Laterality Date    CATARACT EXTRACTION Bilateral     EP ABLATION PULMONARY VEIN ISOLATION N/A " 8/16/2024    Procedure: Ablation Atrial Fibrillation;  Surgeon: Serena Turpin MD;  Location: [facility]    RADIOFREQUENCY ABLATION, UTERINE      TUBAL LIGATION       Family History   Problem Relation Age of Onset    Hypertension Mother     Hypothyroidism Mother     Hypertension Father     Atrial fibrillation Father         Social History     Socioeconomic History    Marital status:      Spouse name: Not on file    Number of children: Not on file    Years of education: Not on file    Highest education level: Not on file   Occupational History    Not on file   Tobacco Use    Smoking status: Never    Smokeless tobacco: Never   Substance and Sexual Activity    Alcohol use: Not Currently    Drug use: Never    Sexual activity: Not on file   Other Topics Concern    Not on file   Social History Narrative    Not on file     Social Determinants of Health     Financial Resource Strain: Not on file   Food Insecurity: Not on file   Transportation Needs: Not on file   Physical Activity: Not on file   Stress: Not on file   Social Connections: Not on file   Interpersonal Safety: Not on file   Housing Stability: Not on file           Medications  Allergies   Current Outpatient Medications   Medication Sig Dispense Refill    apixaban ANTICOAGULANT (ELIQUIS) 5 mg Tab tablet [APIXABAN ANTICOAGULANT (ELIQUIS) 5 MG TAB TABLET] Take 5 mg by mouth 2 (two) times a day.      benazepril (LOTENSIN) 40 MG tablet Take 1 tablet by mouth daily at 2 pm      chlorthalidone (HYGROTON) 25 MG tablet TAKE 1 TABLET(25 MG) BY MOUTH DAILY 90 tablet 1    levothyroxine (SYNTHROID/LEVOTHROID) 112 MCG tablet TAKE 2 TABLETS BY MOUTH EVERY DAY FOR THYROID      metoprolol tartrate (LOPRESSOR) 50 MG tablet Take 1 tablet by mouth 2 times daily Take 50 mg in the morning and 25 mg in the evening         Allergies   Allergen Reactions    Statins [Statins] Muscle Pain (Myalgia)     Has tried atorvastatin and simvastatin       Denies adverse  reaction to anesthesia      Lab Results    Chemistry/lipid CBC Cardiac Enzymes/BNP/TSH/INR   Recent Labs   Lab Test 12/01/23  0922 11/30/22  1014   CHOL 201* 224*   HDL 66 63   * 139*   TRIG 108 111     Recent Labs   Lab Test 08/12/24  0833 12/01/23  0922   * 145   POTASSIUM 4.4 3.9   CHLORIDE 102 106   CO2 31* 27   ANIONGAP <1* 12   * 101*   BUN 15.1 13.0   CR 0.84 0.77   TAY 9.6 9.7      CBC RESULTS:   Recent Labs   Lab Test 08/12/24 0833   WBC 7.3   RBC 4.66   HGB 11.2*   HCT 37.4   MCV 80   MCH 24.0*   MCHC 29.9*   RDW 15.6*           TSH   Date Value Ref Range Status   12/01/2023 0.61 0.30 - 4.20 uIU/mL Final   03/01/2022 5.84 (H) 0.30 - 5.00 uIU/mL Final          The longitudinal plan of care for the diagnosis(es)/condition(s) as documented were addressed during this visit. Due to the added complexity in care, I will continue to support Hedy in the subsequent management and with ongoing continuity of care.

## 2024-09-27 NOTE — PATIENT INSTRUCTIONS
Hedy Haq,    It was a pleasure to see you today at the [facility].     My recommendations after this visit include:    Stop taking flecainide  Decrease metoprolol to 25 mg twice daily.  Monitor blood pressure    Use smart watch ECG to document rhythm during symptoms    Follow up in 6-8 weeks    Qian Coombs CNP  [facility], Electrophysiology  [phone]  EP nurses [phone]

## 2024-09-27 NOTE — LETTER
9/27/2024    Arun Good MD  [address]  [place] [address]    RE: Hedy LUGO Severino       Dear Colleague,     I had the pleasure of seeing Hedy Haq in the [facility].    HEART CARE ELECTROPHYSIOLOGY NOTE      [facility]  [phone]      Assessment/Recommendations   Assessment/Plan:  1. Stage 3A/paroxysmal atrial fibrillation: No symptomatology or evidence of A-fib, but she does have an occasional rhythm irregularity.  She continues to have tendency for sinus bradycardia associated with mild symptoms when heart rates <60 bpm.  She has had an uneventful recovery from ablation with no emergency department or unscheduled clinic visits.  Reviewed avoidance of possible triggers  -- Discontinue flecainide  -- Decrease metoprolol tartrate to 25 mg twice daily  -- Utilize smart watch ECG for rhythm monitoring and documentation of symptoms    She has a [id number] score of 3 for age 65-74, female gender, HTN.  HAS-BLED score of 1 for age.   Briefly discussed alternative of left atrial appendage closure, but she would prefer to remain on DOAC.  -- Continue Eliquis 5 mg twice daily for stroke prophylaxis.  She reports no missed doses or bleeding issues.      2.  Hypertension: Controlled with metoprolol, benazepril, chlorthalidone.  Will attempt to wean metoprolol due to sinus bradycardia.    3.  Obstructive sleep apnea: Consistent use of CPAP nightly.  We discussed the correlation between untreated sleep apnea and atrial arrhythmias.  She was encouraged to maintain compliance with CPAP therapy.     Follow-up 3 months post PVI     History of Present Illness/Subjective    HPI: Hedy LUGO Severino is a 63 year old female who comes in for EP follow up of atrial fibrillation and history and physical prior to pulmonary vein isolation ablation.  She has a history of paroxysmal atrial fibrillation, ascending aortic aneurysm, hypertension, KAREN-on CPAP,  hypothyroidism.    Arrhythmia history  Dx/date: 8/2020 by MCT.  Symptom onset 2020.  Sx: Palpitations  [id number] score: 3 for age 65-74, female gender, HTN  Oral anticoagulation: 1 for age   Antiarrhythmic medications, AV linda blocking agents: flecainide (10/2020-present)   Procedures  DCCV: None  Ablation: 8/16/2024 by Dr. Turpin (RF PVI)    Hedy states she feels well.  She has not had any A-fib, but reports an occasional irregularity.  She is aware of her heart rate being low with some activity intolerance and feels better when it is 60 bpm or higher.  She reports an uneventful recovery from ablation with no groin site issues, significant heartburn, difficulty swallowing, or neurologic changes.  She denies chest discomfort, palpitations, abdominal fullness/bloating or peripheral edema, shortness of breath, paroxysmal nocturnal dyspnea, orthopnea, lightheadedness, dizziness, pre-syncope, or syncope.    Cardiographics (EKG personally reviewed):  EKG done 8/16/2024 shows sinus bradycardia 55 bpm, QRS 88 ms, QT/QTc 466/445 ms  EKG done 8/12/2024 shows sinus bradycardia at 47 bpm, QRS 86 ms, QT/QTc 440/392 ms.    ELVIRA worn 8/25/2020:  1. Highly abnormal 21 days event monitor demonstrating a significantly increased burden of atrial fibrillation with overall controlled ventricular rates.   Overall AF burden was 14%.   2. Nocturnal bradycardia and pauses less than 3 seconds mainly associated with AF.  3. Symptomatic transmissions for symptoms of skipped beat associated with PACs and PVCs.    ECHO done 8/25/2020:     1.Negative pharmacological regadenoson ECG for ischemia.     2.The nuclear stress test is negative for inducible myocardial ischemia or infarction.  Below defect is felt to represent breast attenuation on perfusion diagram.     3.The left ventricular ejection fraction at stress is 59%.     4.There is no prior study for comparison.    NM stress test done 8/25/2020:     1.Negative pharmacological  "regadenoson ECG for ischemia.     2.The nuclear stress test is negative for inducible myocardial ischemia or infarction.  Below defect is felt to represent breast attenuation on perfusion diagram.     3.The left ventricular ejection fraction at stress is 59%.     4.There is no prior study for comparison.    I have reviewed and updated the patient's Past Medical History, Social History, Family History and Medication List.  Outside records personally reviewed.     Physical Examination  Review of Systems   Vitals: BP (!) 144/80 (BP Location: Right arm, Patient Position: Sitting, Cuff Size: Adult Large)   Pulse (!) 48   Resp 16   Ht 1.702 m (5' 7\")   Wt 121.6 kg (268 lb)   BMI 41.97 kg/m    BMI= Body mass index is 41.97 kg/m .  Wt Readings from Last 3 Encounters:   09/27/24 121.6 kg (268 lb)   08/16/24 120.7 kg (266 lb)   08/12/24 120.7 kg (266 lb)       General Appearance:   Alert, well-appearing and in no acute distress.   HEENT: Atraumatic, normocephalic.  No scleral icterus, normal conjunctivae, EOMs intact, PERRL.  Mucous membranes pink and moist.     Chest/Lungs:   Chest symmetric, spine straight.  Respirations unlabored.  Lungs are clear to auscultation.   Cardiovascular:   Regular rate and rhythm.  Normal first and second heart sounds with no murmurs, rubs, or gallops; radial and posterior tibial pulses are intact, no edema.   Abdomen:  Soft, nondistended.   Extremities: No cyanosis or clubbing.   Musculoskeletal: Moves all extremities.     Skin: Warm, dry, intact.    Neurologic: Mood and affect are appropriate.  Alert and oriented to person, place, time, and situation.     ROS: 10 point ROS neg other than the symptoms noted above in the HPI.         Medical History  Surgical History Family History Social History   Past Medical History:   Diagnosis Date     Acquired hypothyroidism 08/03/2020     Essential hypertension, benign 08/03/2020     KAREN (obstructive sleep apnea) 10/19/2020     Paroxysmal atrial " fibrillation (H) 08/03/2020     Sinus bradycardia 06/05/2023     Thoracic aortic aneurysm without rupture (H) 10/19/2020     Past Surgical History:   Procedure Laterality Date     CATARACT EXTRACTION Bilateral      EP ABLATION PULMONARY VEIN ISOLATION N/A 8/16/2024    Procedure: Ablation Atrial Fibrillation;  Surgeon: Serena Turpin MD;  Location: [facility]     RADIOFREQUENCY ABLATION, UTERINE       TUBAL LIGATION       Family History   Problem Relation Age of Onset     Hypertension Mother      Hypothyroidism Mother      Hypertension Father      Atrial fibrillation Father         Social History     Socioeconomic History     Marital status:      Spouse name: Not on file     Number of children: Not on file     Years of education: Not on file     Highest education level: Not on file   Occupational History     Not on file   Tobacco Use     Smoking status: Never     Smokeless tobacco: Never   Substance and Sexual Activity     Alcohol use: Not Currently     Drug use: Never     Sexual activity: Not on file   Other Topics Concern     Not on file   Social History Narrative     Not on file     Social Determinants of Health     Financial Resource Strain: Not on file   Food Insecurity: Not on file   Transportation Needs: Not on file   Physical Activity: Not on file   Stress: Not on file   Social Connections: Not on file   Interpersonal Safety: Not on file   Housing Stability: Not on file           Medications  Allergies   Current Outpatient Medications   Medication Sig Dispense Refill     apixaban ANTICOAGULANT (ELIQUIS) 5 mg Tab tablet [APIXABAN ANTICOAGULANT (ELIQUIS) 5 MG TAB TABLET] Take 5 mg by mouth 2 (two) times a day.       benazepril (LOTENSIN) 40 MG tablet Take 1 tablet by mouth daily at 2 pm       chlorthalidone (HYGROTON) 25 MG tablet TAKE 1 TABLET(25 MG) BY MOUTH DAILY 90 tablet 1     levothyroxine (SYNTHROID/LEVOTHROID) 112 MCG tablet TAKE 2 TABLETS BY MOUTH EVERY DAY FOR THYROID        metoprolol tartrate (LOPRESSOR) 50 MG tablet Take 1 tablet by mouth 2 times daily Take 50 mg in the morning and 25 mg in the evening         Allergies   Allergen Reactions     Statins [Statins] Muscle Pain (Myalgia)     Has tried atorvastatin and simvastatin       Denies adverse reaction to anesthesia      Lab Results    Chemistry/lipid CBC Cardiac Enzymes/BNP/TSH/INR   Recent Labs   Lab Test 12/01/23  0922 11/30/22  1014   CHOL 201* 224*   HDL 66 63   * 139*   TRIG 108 111     Recent Labs   Lab Test 08/12/24  0833 12/01/23  0922   * 145   POTASSIUM 4.4 3.9   CHLORIDE 102 106   CO2 31* 27   ANIONGAP <1* 12   * 101*   BUN 15.1 13.0   CR 0.84 0.77   TAY 9.6 9.7      CBC RESULTS:   Recent Labs   Lab Test 08/12/24  0833   WBC 7.3   RBC 4.66   HGB 11.2*   HCT 37.4   MCV 80   MCH 24.0*   MCHC 29.9*   RDW 15.6*           TSH   Date Value Ref Range Status   12/01/2023 0.61 0.30 - 4.20 uIU/mL Final   03/01/2022 5.84 (H) 0.30 - 5.00 uIU/mL Final          The longitudinal plan of care for the diagnosis(es)/condition(s) as documented were addressed during this visit. Due to the added complexity in care, I will continue to support Hedy in the subsequent management and with ongoing continuity of care.                                             Thank you for allowing me to participate in the care of your patient.      Sincerely,     SERGEY Lim [facility]  cc:   Serena Turpin MD  [address]  [place] [address]

## 2024-11-13 ENCOUNTER — HOSPITAL ENCOUNTER (OUTPATIENT)
Dept: CT IMAGING | Facility: HOSPITAL | Age: 64
Discharge: HOME | End: 2024-11-13
Attending: INTERNAL MEDICINE | Admitting: INTERNAL MEDICINE
Payer: COMMERCIAL

## 2024-11-13 DIAGNOSIS — I71.20 THORACIC AORTIC ANEURYSM WITHOUT RUPTURE, UNSPECIFIED PART (H): ICD-10-CM

## 2024-11-13 PROCEDURE — 250N000011 HC RX IP 250 OP 636: Performed by: INTERNAL MEDICINE

## 2024-11-13 PROCEDURE — 71260 CT THORAX DX C+: CPT

## 2024-11-13 PROCEDURE — 250N000009 HC RX 250: Performed by: INTERNAL MEDICINE

## 2024-11-13 RX ORDER — IOPAMIDOL 755 MG/ML
90 INJECTION, SOLUTION INTRAVASCULAR ONCE
Status: COMPLETED | OUTPATIENT
Start: 2024-11-13 | End: 2024-11-13

## 2024-11-13 RX ADMIN — IOPAMIDOL 90 ML: 755 INJECTION, SOLUTION INTRAVENOUS at 08:59

## 2024-11-13 RX ADMIN — SODIUM CHLORIDE 72 ML: 9 INJECTION, SOLUTION INTRAVENOUS at 09:03

## 2024-12-05 ENCOUNTER — TRANSFERRED RECORDS (OUTPATIENT)
Dept: HEALTH INFORMATION MANAGEMENT | Facility: CLINIC | Age: 64
End: 2024-12-05

## 2024-12-05 ENCOUNTER — LAB REQUISITION (OUTPATIENT)
Dept: LAB | Facility: CLINIC | Age: 64
End: 2024-12-05
Payer: COMMERCIAL

## 2024-12-05 DIAGNOSIS — E03.9 HYPOTHYROIDISM, UNSPECIFIED: ICD-10-CM

## 2024-12-05 DIAGNOSIS — E78.2 MIXED HYPERLIPIDEMIA: ICD-10-CM

## 2024-12-05 DIAGNOSIS — I10 ESSENTIAL (PRIMARY) HYPERTENSION: ICD-10-CM

## 2024-12-05 LAB
ALBUMIN SERPL BCG-MCNC: 4.2 G/DL (ref 3.5–5.2)
ALP SERPL-CCNC: 85 U/L (ref 40–150)
ALT SERPL W P-5'-P-CCNC: 20 U/L (ref 0–50)
ANION GAP SERPL CALCULATED.3IONS-SCNC: 10 MMOL/L (ref 7–15)
AST SERPL W P-5'-P-CCNC: 20 U/L (ref 0–45)
BASOPHILS # BLD AUTO: 0.1 10E3/UL (ref 0–0.2)
BASOPHILS NFR BLD AUTO: 1 %
BILIRUB SERPL-MCNC: 0.3 MG/DL
BUN SERPL-MCNC: 12.2 MG/DL (ref 8–23)
CALCIUM SERPL-MCNC: 9.9 MG/DL (ref 8.8–10.4)
CHLORIDE SERPL-SCNC: 101 MMOL/L (ref 98–107)
CHOLEST SERPL-MCNC: 208 MG/DL
CREAT SERPL-MCNC: 0.67 MG/DL (ref 0.51–0.95)
EGFRCR SERPLBLD CKD-EPI 2021: >90 ML/MIN/1.73M2
EOSINOPHIL # BLD AUTO: 0.2 10E3/UL (ref 0–0.7)
EOSINOPHIL NFR BLD AUTO: 3 %
ERYTHROCYTE [DISTWIDTH] IN BLOOD BY AUTOMATED COUNT: 16.9 % (ref 10–15)
FASTING STATUS PATIENT QL REPORTED: ABNORMAL
FASTING STATUS PATIENT QL REPORTED: ABNORMAL
GLUCOSE SERPL-MCNC: 108 MG/DL (ref 70–99)
HCO3 SERPL-SCNC: 24 MMOL/L (ref 22–29)
HCT VFR BLD AUTO: 37.1 % (ref 35–47)
HDLC SERPL-MCNC: 69 MG/DL
HGB BLD-MCNC: 11.3 G/DL (ref 11.7–15.7)
IMM GRANULOCYTES # BLD: 0 10E3/UL
IMM GRANULOCYTES NFR BLD: 0 %
LDLC SERPL CALC-MCNC: 121 MG/DL
LYMPHOCYTES # BLD AUTO: 1.9 10E3/UL (ref 0.8–5.3)
LYMPHOCYTES NFR BLD AUTO: 27 %
MCH RBC QN AUTO: 24.1 PG (ref 26.5–33)
MCHC RBC AUTO-ENTMCNC: 30.5 G/DL (ref 31.5–36.5)
MCV RBC AUTO: 79 FL (ref 78–100)
MONOCYTES # BLD AUTO: 0.7 10E3/UL (ref 0–1.3)
MONOCYTES NFR BLD AUTO: 9 %
NEUTROPHILS # BLD AUTO: 4.3 10E3/UL (ref 1.6–8.3)
NEUTROPHILS NFR BLD AUTO: 60 %
NONHDLC SERPL-MCNC: 139 MG/DL
NRBC # BLD AUTO: 0 10E3/UL
NRBC BLD AUTO-RTO: 0 /100
PLATELET # BLD AUTO: 331 10E3/UL (ref 150–450)
POTASSIUM SERPL-SCNC: 3.6 MMOL/L (ref 3.4–5.3)
PROT SERPL-MCNC: 7 G/DL (ref 6.4–8.3)
RBC # BLD AUTO: 4.69 10E6/UL (ref 3.8–5.2)
SODIUM SERPL-SCNC: 135 MMOL/L (ref 135–145)
TRIGL SERPL-MCNC: 91 MG/DL
TSH SERPL DL<=0.005 MIU/L-ACNC: 0.56 UIU/ML (ref 0.3–4.2)
WBC # BLD AUTO: 7.1 10E3/UL (ref 4–11)

## 2024-12-05 PROCEDURE — 85025 COMPLETE CBC W/AUTO DIFF WBC: CPT | Mod: ORL | Performed by: FAMILY MEDICINE

## 2024-12-05 PROCEDURE — 80053 COMPREHEN METABOLIC PANEL: CPT | Mod: ORL | Performed by: FAMILY MEDICINE

## 2024-12-05 PROCEDURE — 84443 ASSAY THYROID STIM HORMONE: CPT | Mod: ORL | Performed by: FAMILY MEDICINE

## 2024-12-05 PROCEDURE — 80061 LIPID PANEL: CPT | Mod: ORL | Performed by: FAMILY MEDICINE

## 2025-01-15 DIAGNOSIS — I10 ESSENTIAL HYPERTENSION, BENIGN: ICD-10-CM

## 2025-01-15 RX ORDER — CHLORTHALIDONE 25 MG/1
TABLET ORAL
Qty: 90 TABLET | Refills: 1 | Status: SHIPPED | OUTPATIENT
Start: 2025-01-15

## 2025-07-14 DIAGNOSIS — I10 ESSENTIAL HYPERTENSION, BENIGN: ICD-10-CM

## 2025-07-14 RX ORDER — CHLORTHALIDONE 25 MG/1
25 TABLET ORAL DAILY
Qty: 90 TABLET | Refills: 0 | Status: SHIPPED | OUTPATIENT
Start: 2025-07-14

## 2025-08-19 ENCOUNTER — OFFICE VISIT (OUTPATIENT)
Dept: CARDIOLOGY | Facility: CLINIC | Age: 65
End: 2025-08-19
Payer: COMMERCIAL

## 2025-08-19 VITALS
WEIGHT: 269.6 LBS | SYSTOLIC BLOOD PRESSURE: 136 MMHG | DIASTOLIC BLOOD PRESSURE: 82 MMHG | HEART RATE: 60 BPM | RESPIRATION RATE: 16 BRPM | BODY MASS INDEX: 42.23 KG/M2

## 2025-08-19 DIAGNOSIS — E03.9 ACQUIRED HYPOTHYROIDISM: ICD-10-CM

## 2025-08-19 DIAGNOSIS — G47.33 OSA (OBSTRUCTIVE SLEEP APNEA): ICD-10-CM

## 2025-08-19 DIAGNOSIS — E78.00 PURE HYPERCHOLESTEROLEMIA: ICD-10-CM

## 2025-08-19 DIAGNOSIS — I48.0 PAROXYSMAL ATRIAL FIBRILLATION (H): ICD-10-CM

## 2025-08-19 DIAGNOSIS — E66.01 MORBID OBESITY (H): ICD-10-CM

## 2025-08-19 DIAGNOSIS — I10 ESSENTIAL HYPERTENSION, BENIGN: ICD-10-CM

## 2025-08-19 DIAGNOSIS — I71.20 THORACIC AORTIC ANEURYSM WITHOUT RUPTURE, UNSPECIFIED PART: Primary | ICD-10-CM

## 2025-08-19 PROBLEM — E66.812 CLASS 2 OBESITY DUE TO EXCESS CALORIES IN ADULT: Status: RESOLVED | Noted: 2020-08-03 | Resolved: 2025-08-19

## 2025-08-19 PROBLEM — E66.09 CLASS 2 OBESITY DUE TO EXCESS CALORIES IN ADULT: Status: RESOLVED | Noted: 2020-08-03 | Resolved: 2025-08-19

## 2025-08-19 PROCEDURE — 3079F DIAST BP 80-89 MM HG: CPT | Performed by: INTERNAL MEDICINE

## 2025-08-19 PROCEDURE — 99214 OFFICE O/P EST MOD 30 MIN: CPT | Performed by: INTERNAL MEDICINE

## 2025-08-19 PROCEDURE — 3075F SYST BP GE 130 - 139MM HG: CPT | Performed by: INTERNAL MEDICINE

## 2025-08-19 PROCEDURE — G2211 COMPLEX E/M VISIT ADD ON: HCPCS | Performed by: INTERNAL MEDICINE

## (undated) DEVICE — INTRODUCER SHEATH VASC CATH 8.5FR CARTO GIDE STH MED D138502

## (undated) DEVICE — CUSTOM PACK EP

## (undated) DEVICE — PATCH CARTO 3 EXTERNAL REFERENCE 3D MAPPING CREFP6

## (undated) DEVICE — TRANSDUCER TRAY ARTERIAL 42646-06

## (undated) DEVICE — ELECTRODE DEFIB CADENCE 22550R

## (undated) DEVICE — PROBE TEMP CIRCA S-CATH M ESPH 12-SNSR 3D MAP CS-21EP

## (undated) DEVICE — 8F SOUNDSTAR ECO ULTRASOUND CATHETER

## (undated) DEVICE — CATHETER OCTARAY LONG SPLINE CURVE F 3-3-3-3-3 D160906

## (undated) DEVICE — CATH EP 7FR X 115CM DECANAV CA

## (undated) DEVICE — INTRO SHEATH 9FRX10CM PINNACLE RSS902

## (undated) DEVICE — GUIDEWIRE PROTRACK PGTL .025IN DIA 23

## (undated) DEVICE — INTRO SHEATH 8FRX10CM PINNACLE RSS802

## (undated) DEVICE — CATH ABLATION 8FR 115CM F-J CURVE BI-DIR QDOT MICRO D139504

## (undated) DEVICE — TUBE SET SMARKABLATE IRRIGATION

## (undated) DEVICE — NEEDLE 71CM NRG TRANSSEPTAL C0  8F FIX CURVE NRG-E-HF-71-C0

## (undated) DEVICE — Device

## (undated) RX ORDER — DEXAMETHASONE SODIUM PHOSPHATE 10 MG/ML
INJECTION, SOLUTION INTRAMUSCULAR; INTRAVENOUS
Status: DISPENSED
Start: 2024-08-16

## (undated) RX ORDER — ISOPROTERENOL HYDROCHLORIDE 0.2 MG/ML
INJECTION, SOLUTION INTRAVENOUS
Status: DISPENSED
Start: 2024-08-16

## (undated) RX ORDER — LIDOCAINE HYDROCHLORIDE AND EPINEPHRINE 10; 10 MG/ML; UG/ML
INJECTION, SOLUTION INFILTRATION; PERINEURAL
Status: DISPENSED
Start: 2024-08-16

## (undated) RX ORDER — FENTANYL CITRATE 50 UG/ML
INJECTION, SOLUTION INTRAMUSCULAR; INTRAVENOUS
Status: DISPENSED
Start: 2024-08-16

## (undated) RX ORDER — HEPARIN SODIUM 10000 [USP'U]/100ML
INJECTION, SOLUTION INTRAVENOUS
Status: DISPENSED
Start: 2024-08-16

## (undated) RX ORDER — PROTAMINE SULFATE 10 MG/ML
INJECTION, SOLUTION INTRAVENOUS
Status: DISPENSED
Start: 2024-08-16

## (undated) RX ORDER — ONDANSETRON 2 MG/ML
INJECTION INTRAMUSCULAR; INTRAVENOUS
Status: DISPENSED
Start: 2024-08-16

## (undated) RX ORDER — LIDOCAINE HYDROCHLORIDE 10 MG/ML
INJECTION, SOLUTION EPIDURAL; INFILTRATION; INTRACAUDAL; PERINEURAL
Status: DISPENSED
Start: 2024-08-16

## (undated) RX ORDER — PROPOFOL 10 MG/ML
INJECTION, EMULSION INTRAVENOUS
Status: DISPENSED
Start: 2024-08-16

## (undated) RX ORDER — ADENOSINE 3 MG/ML
INJECTION, SOLUTION INTRAVENOUS
Status: DISPENSED
Start: 2024-08-16